# Patient Record
Sex: FEMALE | Race: WHITE | Employment: OTHER | URBAN - METROPOLITAN AREA
[De-identification: names, ages, dates, MRNs, and addresses within clinical notes are randomized per-mention and may not be internally consistent; named-entity substitution may affect disease eponyms.]

---

## 2017-02-03 ENCOUNTER — HOSPITAL ENCOUNTER (OUTPATIENT)
Dept: MRI IMAGING | Age: 77
Discharge: HOME | End: 2017-02-03
Attending: ORTHOPAEDIC SURGERY
Payer: MEDICARE

## 2017-02-03 DIAGNOSIS — M75.101 ROTATOR CUFF SYNDROME OF RIGHT SHOULDER: ICD-10-CM

## 2017-02-03 PROCEDURE — 73221 MRI JOINT UPR EXTREM W/O DYE: CPT

## 2017-03-29 ENCOUNTER — OFFICE VISIT (OUTPATIENT)
Dept: CARDIOLOGY CLINIC | Age: 77
End: 2017-03-29

## 2017-03-29 VITALS
HEIGHT: 61 IN | SYSTOLIC BLOOD PRESSURE: 130 MMHG | OXYGEN SATURATION: 99 % | WEIGHT: 139.4 LBS | DIASTOLIC BLOOD PRESSURE: 60 MMHG | BODY MASS INDEX: 26.32 KG/M2 | HEART RATE: 70 BPM

## 2017-03-29 DIAGNOSIS — R01.1 SYSTOLIC EJECTION MURMUR: ICD-10-CM

## 2017-03-29 DIAGNOSIS — I10 ESSENTIAL HYPERTENSION: Primary | ICD-10-CM

## 2017-03-29 PROBLEM — E78.5 HYPERLIPIDEMIA: Status: ACTIVE | Noted: 2017-03-29

## 2017-03-29 RX ORDER — MELATONIN
DAILY
COMMUNITY
End: 2018-02-15

## 2017-03-29 RX ORDER — FENOFIBRATE 160 MG/1
160 TABLET ORAL DAILY
COMMUNITY
End: 2019-07-06

## 2017-03-29 RX ORDER — AMLODIPINE BESYLATE 10 MG/1
TABLET ORAL DAILY
COMMUNITY
End: 2019-07-06

## 2017-03-29 RX ORDER — LOSARTAN POTASSIUM 100 MG/1
100 TABLET ORAL DAILY
COMMUNITY
End: 2019-07-06

## 2017-03-29 RX ORDER — ASPIRIN 81 MG/1
TABLET ORAL DAILY
COMMUNITY
End: 2019-07-06

## 2017-03-29 RX ORDER — OMEPRAZOLE 20 MG/1
20 CAPSULE, DELAYED RELEASE ORAL DAILY
COMMUNITY
End: 2019-07-06

## 2017-03-29 NOTE — PROGRESS NOTES
Visit Vitals    /60 (BP 1 Location: Right arm, BP Patient Position: Sitting)    Pulse 70    Ht 5' 1\" (1.549 m)    Wt 139 lb 6.4 oz (63.2 kg)    SpO2 99%    BMI 26.34 kg/m2

## 2017-03-29 NOTE — PROGRESS NOTES
Joe Covarrubias MD [facility] [place]  [address]  [place], [address] [facility]    Office [phone]  Fax [phone]  Cell [phone]        Sameer Childers is a 64 y.o. female referred for evaluation of \"refractory HTN\". Consult requested by AICHA Duarte. Assessment  Encounter Diagnoses   Name Primary?  Essential hypertension Yes    Systolic ejection murmur        Recommendations:    Sameer Childers has chronic HTN controlled over the years on combination therapy. Recent elevation in systolic BP over the past month, likely related to orthopedic issues. She follows a low sodium diet and no symptoms of sleep apnea. Her BP is normal today. Her exam is significant for murmur of aortic sclerosis. At this junction, I do not see the need to intensify therapy, but could start HCTZ if her systolic BP starts to creep up again. Favor baseline echocardiogram to evaluate for LVH. If her echocardiogram and follow up BPs are unremarkable, I will see her back as needed. Subjective:    Ms. Dennis Vigil has chronic HTN on two medication regimen (Amlodipine and Losartan) but no other cardiac hx. Historically, her blood pressure is under good control. Two years ago, her BPs ran in the 715F systolic and 64-49M diastolic range. Over the past month, her systolic BPs have been in the 130-150s range, which is unusual for her. Evaluated by [facility] who started her on Norvasc 10mg daily. She injured her rotator cuff while moving boxes and was put on Flexeril. This has caused her significant stress and discomfort which may have contributed to elevated systolic blood pressure. She leads a very active lifestyle. She follows a low sodium diet. She sleeps well and denies any symptoms of sleep apnea. Patient denies any exertional chest pain, dyspnea, palpitations, syncope, orthopnea, edema or paroxysmal nocturnal dyspnea.     Cardiac risk factors   HTN yes  DM no  Smoking no  Family hx of CAD no    Cardiac testing  No specialty comments available. No past medical history on file. Current Outpatient Prescriptions   Medication Sig Dispense Refill    amLODIPine (NORVASC) 10 mg tablet Take  by mouth daily.  fenofibrate (LOFIBRA) 160 mg tablet Take 160 mg by mouth daily.  losartan (COZAAR) 100 mg tablet Take 100 mg by mouth daily.  omeprazole (PRILOSEC) 20 mg capsule Take 20 mg by mouth daily.  cholecalciferol (VITAMIN D3) 1,000 unit tablet Take  by mouth daily.  aspirin delayed-release 81 mg tablet Take  by mouth daily. Allergies   Allergen Reactions    Ampicillin Contact Dermatitis    Lisinopril Unknown (comments)     Mind confusion                Review of Systems  Constitutional: Negative for fever, chills, malaise/fatigue and diaphoresis. Respiratory: Negative for cough, hemoptysis, sputum production, shortness of breath and wheezing. Cardiovascular: Negative for chest pain, palpitations, orthopnea, claudication, leg swelling and PND. Gastrointestinal: Negative for heartburn, nausea, vomiting, blood in stool and melena. Genitourinary: Negative for dysuria and flank pain. Musculoskeletal: Negative for joint pain and back pain. Skin: Negative for rash. Neurological: Negative for focal weakness, seizures, loss of consciousness, weakness and headaches. Endo/Heme/Allergies: Does not bruise/bleed easily. Psychiatric/Behavioral: Negative for memory loss. The patient does not have insomnia. Physical Exam    Visit Vitals    /60 (BP 1 Location: Right arm, BP Patient Position: Sitting)    Pulse 70    Ht 5' 1\" (1.549 m)    Wt 139 lb 6.4 oz (63.2 kg)    SpO2 99%    BMI 26.34 kg/m2     Wt Readings from Last 3 Encounters:   03/29/17 139 lb 6.4 oz (63.2 kg)      General - well developed well nourished  Neck - JVP normal, thyroid nl  Cardiac - normal S1, S2, rubs or gallops. No clicks.  +Grade 1-2 early peaking systolic ejection murmur at the right base.     Vascular - carotids without bruits, radials, femorals and pedal pulses equal bilateral  Lungs - clear to auscultation bilaterals, no rales, wheezing or rhonchi  Abd - soft nontender, no HSM, no abd bruits  Extremities - no edema  Skin - no rash  Neuro - nonfocal  Psych - normal mood and affect    Cardiographics    EKG 03/29/17-SR, normal     Written by Erika Shipley, as dictated by Sumeet Durbin MD.   Sumeet Durbin MD

## 2017-03-29 NOTE — MR AVS SNAPSHOT
Visit Information Date & Time Provider Department Dept. Phone Encounter #  
 3/29/2017 11:00 AM Sumeet Durbin MD [facility] [place] [phone] [id number] Upcoming Health Maintenance Date Due DTaP/Tdap/Td series (1 - Tdap) 11/2/1959 ZOSTER VACCINE AGE 60> 11/2/1998 GLAUCOMA SCREENING Q2Y 11/2/2003 OSTEOPOROSIS SCREENING (DEXA) 11/2/2003 Pneumococcal 65+ Low/Medium Risk (1 of 2 - PCV13) 11/2/2003 MEDICARE YEARLY EXAM 11/2/2003 INFLUENZA AGE 9 TO ADULT 8/1/2016 Allergies as of 3/29/2017  Review Complete On: 3/29/2017 By: Shannen Zheng  
  
 Severity Noted Reaction Type Reactions Ampicillin  03/29/2017    Contact Dermatitis Lisinopril  03/29/2017    Unknown (comments) Mind confusion Current Immunizations  Never Reviewed No immunizations on file. Not reviewed this visit You Were Diagnosed With   
  
 Codes Comments Essential hypertension    -  Primary ICD-10-CM: I10 
ICD-9-CM: 401.9 Familial hypercholesterolemia     ICD-10-CM: E78.01 
ICD-9-CM: 272.0 Vitals BP Pulse Height(growth percentile) Weight(growth percentile) SpO2 BMI  
 130/60 (BP 1 Location: Right arm, BP Patient Position: Sitting) 70 5' 1\" (1.549 m) 139 lb 6.4 oz (63.2 kg) 99% 26.34 kg/m2 Smoking Status Never Smoker Vitals History BMI and BSA Data Body Mass Index Body Surface Area  
 26.34 kg/m 2 1.65 m 2 Preferred Pharmacy Pharmacy Name Phone [facility] [address] [phone] Your Updated Medication List  
  
   
This list is accurate as of: 3/29/17 11:26 AM.  Always use your most recent med list. amLODIPine 10 mg tablet Commonly known as:  Benjiman Floro Take  by mouth daily. aspirin delayed-release 81 mg tablet Take  by mouth daily. fenofibrate 160 mg tablet Commonly known as:  LOFIBRA Take 160 mg by mouth daily. losartan 100 mg tablet Commonly known as:  COZAAR Take 100 mg by mouth daily. omeprazole 20 mg capsule Commonly known as:  PRILOSEC Take 20 mg by mouth daily. VITAMIN D3 1,000 unit tablet Generic drug:  cholecalciferol Take  by mouth daily. We Performed the Following AMB POC EKG ROUTINE W/ 12 LEADS, INTER & REP [65167 CPT(R)] Introducing [facility] SERVICES! [facility] introduces Glints patient portal. Now you can access parts of your medical record, email your doctor's office, and request medication refills online. 1. In your internet browser, go to [email or web address] 2. Click on the First Time User? Click Here link in the Sign In box. You will see the New Member Sign Up page. 3. Enter your Glints Access Code exactly as it appears below. You will not need to use this code after youve completed the sign-up process. If you do not sign up before the expiration date, you must request a new code. · Glints Access Code: [email or web address] Expires: 4/30/2017  9:43 AM 
 
4. Enter the last four digits of your Social Security Number (xxxx) and Date of Birth (mm/dd/yyyy) as indicated and click Submit. You will be taken to the next sign-up page. 5. Create a Glints ID. This will be your Glints login ID and cannot be changed, so think of one that is secure and easy to remember. 6. Create a Glints password. You can change your password at any time. 7. Enter your Password Reset Question and Answer. This can be used at a later time if you forget your password. 8. Enter your e-mail address. You will receive e-mail notification when new information is available in [address] 9. Click Sign Up. You can now view and download portions of your medical record. 10. Click the Download Summary menu link to download a portable copy of your medical information. If you have questions, please visit the Frequently Asked Questions section of the [employer] website. Remember, [employer] is NOT to be used for urgent needs. For medical emergencies, dial 911. Now available from your iPhone and Android! Please provide this summary of care documentation to your next provider. Your primary care clinician is listed as Audrey Gutierrez. If you have any questions after today's visit, please call [phone].

## 2017-04-07 ENCOUNTER — CLINICAL SUPPORT (OUTPATIENT)
Dept: CARDIOLOGY CLINIC | Age: 77
End: 2017-04-07

## 2017-04-07 DIAGNOSIS — I10 ESSENTIAL HYPERTENSION: ICD-10-CM

## 2017-04-11 ENCOUNTER — TELEPHONE (OUTPATIENT)
Dept: CARDIOLOGY CLINIC | Age: 77
End: 2017-04-11

## 2017-04-11 NOTE — TELEPHONE ENCOUNTER
Cardiac testing  Echo 4/7/17 - EF 55-60%. No WMA. Grade 1 diastolic dysfunction. Mild LAE. Ms. Deysi Churchill was seen by Dr. Lily Castano as a new patient on 3/29/17, referred for evaluation of \"refractory HTN\" by AICHA Stinson. Left a voicemail for Ms. Gracia to return my call to discuss Echo results. Note preserved EF with no LVH. No additional cardiac testing is needed at this time. Plan to also follow up on BP trend since her initial visit. If BP is stable, we would be happy to see her again PRN.

## 2017-04-12 ENCOUNTER — TELEPHONE (OUTPATIENT)
Dept: CARDIOLOGY CLINIC | Age: 77
End: 2017-04-12

## 2017-04-12 NOTE — TELEPHONE ENCOUNTER
Patient notified of results. She voices understanding. She states her BP this morning was 135/56. P76. This is pretty consistent.

## 2017-08-31 ENCOUNTER — OFFICE VISIT (OUTPATIENT)
Dept: OBGYN CLINIC | Age: 77
End: 2017-08-31

## 2017-08-31 VITALS
BODY MASS INDEX: 26.21 KG/M2 | WEIGHT: 138.8 LBS | HEIGHT: 61 IN | SYSTOLIC BLOOD PRESSURE: 148 MMHG | DIASTOLIC BLOOD PRESSURE: 70 MMHG

## 2017-08-31 DIAGNOSIS — R10.2 VULVAR PAIN: ICD-10-CM

## 2017-08-31 DIAGNOSIS — Z87.440 HISTORY OF CYSTITIS: Primary | ICD-10-CM

## 2017-08-31 LAB
BILIRUB UR QL STRIP: NEGATIVE
GLUCOSE UR-MCNC: NEGATIVE MG/DL
KETONES P FAST UR STRIP-MCNC: NEGATIVE MG/DL
PH UR STRIP: NORMAL [PH] (ref 4.6–8)
PROT UR QL STRIP: NEGATIVE MG/DL
SP GR UR STRIP: NORMAL (ref 1–1.03)
UA UROBILINOGEN AMB POC: NORMAL (ref 0.2–1)
URINALYSIS CLARITY POC: CLEAR
URINALYSIS COLOR POC: YELLOW
URINE BLOOD POC: NEGATIVE
URINE LEUKOCYTES POC: NEGATIVE
URINE NITRITES POC: NEGATIVE

## 2017-08-31 NOTE — PROGRESS NOTES
Vulvar lesion evaluation    Val Guillermo is a 64 y.o. female  No LMP recorded. Patient is postmenopausal.who presents with 3 bumps on her right labia. She noticed it a few weeks ago. The bumps have not shown any changes. [facility] for chiggers and burning with urination, urine was positive for nitrates. She was dx with contact dermatitis, cystitis, and yeast infection. Patient completed abx therapy. She went to a follow up appointment on 8/7/17 and was referred to GYN. She reports the following associated symptoms: Symptoms have improved, she denies burning with urination but she still feels a lot of pressure in her vagina. She denies the following associated symptoms: itching, pain, redness, drainage, swelling, irritation. Aggravating factors: none  Alleviating factors: none    Urine dip today is negative for leukocytes, nitrates, and blood. Patient states it has been a few years since her last GYN exam/pap smear, mammogram, and bone density. She said \"I don't need one\"      No past medical history on file. Past Surgical History:   Procedure Laterality Date    HX APPENDECTOMY      HX TUBAL LIGATION       Social History     Occupational History    Not on file. Social History Main Topics    Smoking status: Never Smoker    Smokeless tobacco: Never Used    Alcohol use No    Drug use: No    Sexual activity: Not Currently     Family History   Problem Relation Age of Onset    Cancer Sister      cervical    COPD Brother     Hypertension Mother     Hypertension Father     Emphysema Father        Allergies   Allergen Reactions    Ampicillin Contact Dermatitis    Lisinopril Unknown (comments)     Mind confusion             Prior to Admission medications    Medication Sig Start Date End Date Taking? Authorizing Provider   amLODIPine (NORVASC) 10 mg tablet Take  by mouth daily.    Yes Historical Provider   fenofibrate (LOFIBRA) 160 mg tablet Take 160 mg by mouth daily. Yes Historical Provider   losartan (COZAAR) 100 mg tablet Take 100 mg by mouth daily. Yes Historical Provider   omeprazole (PRILOSEC) 20 mg capsule Take 20 mg by mouth daily. Yes Historical Provider   cholecalciferol (VITAMIN D3) 1,000 unit tablet Take  by mouth daily. Yes Historical Provider   aspirin delayed-release 81 mg tablet Take  by mouth daily.    Yes Historical Provider        Review of Systems: History obtained from the patient  Constitutional: negative for weight loss, fever, night sweats  GI: negative for change in bowel habits, abdominal pain, black or bloody stools  : negative for frequency, dysuria, hematuria, vaginal discharge  MSK: negative for back pain, joint pain, muscle pain  Skin: negative for itching, rash, hives elsewhere  Neuro: negative for dizziness, headache, confusion, weakness  Psych: negative for anxiety, depression, change in mood  Heme/lymph: negative for bleeding, bruising, pallor    Objective:  Visit Vitals    /70    Ht 5' 1\" (1.549 m)    Wt 138 lb 12.8 oz (63 kg)    BMI 26.23 kg/m2       Physical Exam:   PHYSICAL EXAMINATION    Constitutional  · Appearance: well-nourished, well developed, alert, in no acute distress    Gastrointestinal  · Abdominal Examination: abdomen non-tender to palpation, normal bowel sounds, no masses present  · Liver and spleen: no hepatomegaly present, spleen not palpable  · Hernias: no hernias identified    Genitourinary  · External Genitalia: normal appearance for age, no discharge present, no tenderness present, no inflammatory lesions present, no masses present, no atrophy present  · Vagina: normal vaginal vault with mild cystocele,  no discharge present, no inflammatory lesions present, no masses present  · Bladder: non-tender to palpation  · Urethra: appears normal  · Cervix: normal   · Uterus: normal size, small fibroids  · Adnexa: no adnexal tenderness present, no adnexal masses present  · Perineum: perineum within normal limits, no evidence of trauma, no rashes or skin lesions present  · Anus: anus within normal limits, no hemorrhoids present  · Inguinal Lymph Nodes: no lymphadenopathy present    Skin  · General Inspection: no rash, no lesions identified    Neurologic/Psychiatric  · Mental Status:  · Orientation: grossly oriented to person, place and time  · Mood and Affect: mood normal, affect appropriate    Assessment:   Asymptomatic cystocele  Small fibroids  UTI resolved  Vulva normal    Plan:   Does not desire any tx for cystocele - no hx of frequent UTI      RTO prn if symptoms persist or worsen. Instructions given to pt. Handouts given to pt.

## 2017-08-31 NOTE — PATIENT INSTRUCTIONS
Pelvic Exam: Care Instructions  Your Care Instructions    When your doctor examines all of your pelvic organs, it's called a pelvic exam. Two good reasons to have this kind of exam are to check for sexually transmitted infections (STIs) and to get a Pap test. A Pap test is also called a Pap smear. It checks for early changes that can lead to cancer of the cervix. Sometimes a pelvic exam is part of a regular checkup. In this case, you can do some things to make your test results as accurate as possible. · Try to schedule the exam when you don't have your period. · Don't use douches, tampons, or vaginal medicines, sprays, or powders for 24 hours before your exam.  · Don't have sex for 24 hours before your exam.  Other times, women have this kind of exam at any time of the month. This is because they have pelvic pain, bleeding, or discharge. Or they may have another pelvic problem. Before your exam, it's important to share some information with your doctor. For example, if you are a survivor of rape or sexual abuse, you can talk about any concerns you may have. Your doctor will also want to know if you are pregnant or use birth control. And he or she will want to hear about any problems, surgeries, or procedures you have had in your pelvic area. You will also need to tell your doctor when your last period was. Follow-up care is a key part of your treatment and safety. Be sure to make and go to all appointments, and call your doctor if you are having problems. It's also a good idea to know your test results and keep a list of the medicines you take. How is a pelvic exam done? · During a pelvic exam, you will:  ¨ Take off your clothes below the waist. You will get a paper or cloth cover to put over the lower half of your body. Sebastian Bishop on your back on an exam table. Your feet will be raised above you. Stirrups will support your feet. · The doctor will:  Johnny Kim you to relax your knees.  Your knees need to lean out, toward the walls. ¨ Check the opening of your vagina for sores or swelling. ¨ Gently put a tool called a speculum into your vagina. It opens the vagina a little bit. You will feel some pressure. But if you are relaxed, it will not hurt. It lets your doctor see inside the vagina. ¨ Use a small brush, spatula, or swab to get a sample of cells, if you are having a Pap test or culture. The doctor then removes the speculum. ¨ Put on gloves and put one or two fingers of one hand into your vagina. The other hand goes on your lower belly. This lets your doctor feel your pelvic organs. You will probably feel some pressure. Try to stay relaxed. ¨ Put one gloved finger into your rectum and one into your vagina, if needed. This can also help check your pelvic organs. This exam takes about 10 minutes. At the end, you will get a washcloth or tissue to clean your vaginal area. It's normal to have some discharge after this exam. You can then get dressed. Some test results may be ready right away. But results from a culture or a Pap test may take several days or a few weeks. Why should you have a pelvic exam?  · You want to have recommended screening tests. This includes a Pap test.  · You think you have a vaginal infection. Signs include itching, burning, or unusual discharge. · You might have been exposed to a sexually transmitted infection (STI), such as chlamydia or herpes. · You have vaginal bleeding that is not part of your normal menstrual period. · You have pain in your belly or pelvis. · You have been sexually assaulted. A pelvic exam lets your doctor collect evidence and check for STIs. · You are pregnant. · You are having trouble getting pregnant. What are the risks of a pelvic exam?  There are no risks from a pelvic exam.  When should you call for help?   Watch closely for changes in your health, and be sure to contact your doctor if:  · You have heavy bleeding or discharge from your vagina after the exam.  Where can you learn more? Go to http://jay-kasie.info/. Enter J670 in the search box to learn more about \"Pelvic Exam: Care Instructions. \"  Current as of: October 13, 2016  Content Version: 11.3  © 2532-8115 "[employer]", [employer] Care instructions adapted under license by [employer] (which disclaims liability or warranty for this information). If you have questions about a medical condition or this instruction, always ask your healthcare professional. [address] any warranty or liability for your use of this information.

## 2018-01-18 ENCOUNTER — HOSPITAL ENCOUNTER (OUTPATIENT)
Dept: GENERAL RADIOLOGY | Age: 78
Discharge: HOME | End: 2018-01-18
Payer: MEDICARE

## 2018-01-18 DIAGNOSIS — K21.9 GERD (GASTROESOPHAGEAL REFLUX DISEASE): ICD-10-CM

## 2018-01-18 DIAGNOSIS — R10.13 EPIGASTRIC PAIN: ICD-10-CM

## 2018-01-18 PROCEDURE — 71046 X-RAY EXAM CHEST 2 VIEWS: CPT

## 2018-02-15 ENCOUNTER — HOSPITAL ENCOUNTER (EMERGENCY)
Age: 78
Discharge: HOME | End: 2018-02-15
Attending: EMERGENCY MEDICINE
Payer: MEDICARE

## 2018-02-15 VITALS
WEIGHT: 136.91 LBS | SYSTOLIC BLOOD PRESSURE: 146 MMHG | RESPIRATION RATE: 16 BRPM | OXYGEN SATURATION: 98 % | DIASTOLIC BLOOD PRESSURE: 80 MMHG | BODY MASS INDEX: 25.85 KG/M2 | TEMPERATURE: 98.6 F | HEIGHT: 61 IN | HEART RATE: 75 BPM
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
## 2018-02-15 DIAGNOSIS — S41.152A DOG BITE OF ARM, LEFT, INITIAL ENCOUNTER: Primary | ICD-10-CM

## 2018-02-15 PROCEDURE — 74011250636 HC RX REV CODE- 250/636: Performed by: EMERGENCY MEDICINE

## 2018-02-15 PROCEDURE — 74011000250 HC RX REV CODE- 250: Performed by: EMERGENCY MEDICINE

## 2018-02-15 PROCEDURE — 99283 EMERGENCY DEPT VISIT LOW MDM: CPT

## 2018-02-15 PROCEDURE — 90471 IMMUNIZATION ADMIN: CPT

## 2018-02-15 PROCEDURE — 90715 TDAP VACCINE 7 YRS/> IM: CPT | Performed by: EMERGENCY MEDICINE

## 2018-02-15 RX ORDER — BACITRACIN ZINC 500 [USP'U]/G
1 CREAM TOPICAL ONCE
Status: COMPLETED | OUTPATIENT
Start: 2018-02-15 | End: 2018-02-15

## 2018-02-15 RX ORDER — BACITRACIN 500 [USP'U]/G
1 OINTMENT TOPICAL 2 TIMES DAILY
Qty: 1 TUBE | Refills: 0 | Status: SHIPPED | OUTPATIENT
Start: 2018-02-15 | End: 2018-02-25

## 2018-02-15 RX ADMIN — BACITRACIN ZINC 1 PACKET: 500 OINTMENT TOPICAL at 18:39

## 2018-02-15 RX ADMIN — TETANUS TOXOID, REDUCED DIPHTHERIA TOXOID AND ACELLULAR PERTUSSIS VACCINE, ADSORBED 0.5 ML: 5; 2.5; 8; 8; 2.5 SUSPENSION INTRAMUSCULAR at 18:43

## 2018-02-15 NOTE — ED TRIAGE NOTES
Pt.  was bit by an unknown dog at [employer] today to left forearm while walking in the park. Gauze bandage in place. [facility] Animal [facility] was notified and showed up to the scene when it happened. Tetanus shot ~5years ago.

## 2018-02-15 NOTE — DISCHARGE INSTRUCTIONS
Animal Bites: Care Instructions  Your Care Instructions  After an animal bite, the biggest concern is infection. The chance of infection depends on the type of animal that bit you, where on your body you were bitten, and your general health. Many animal bites are not closed with stitches, because this can increase the chance of infection. Your bite may take as little as 7 days or as long as several months to heal, depending on how bad it is. Taking good care of your wound at home will help it heal and reduce your chance of infection. The doctor has checked you carefully, but problems can develop later. If you notice any problems or new symptoms, get medical treatment right away. Follow-up care is a key part of your treatment and safety. Be sure to make and go to all appointments, and call your doctor if you are having problems. It's also a good idea to know your test results and keep a list of the medicines you take. How can you care for yourself at home? · If your doctor told you how to care for your wound, follow your doctor's instructions. If you did not get instructions, follow this general advice:  ¨ After 24 to 48 hours, gently wash the wound with clean water 2 times a day. Do not scrub or soak the wound. Don't use hydrogen peroxide or alcohol, which can slow healing. ¨ You may cover the wound with a thin layer of petroleum jelly, such as Vaseline, and a nonstick bandage. ¨ Apply more petroleum jelly and replace the bandage as needed. · After you shower, gently dry the wound with a clean towel. · If your doctor has closed the wound, cover the bandage with a plastic bag before you take a shower. · A small amount of skin redness and swelling around the wound edges and the stitches or staples is normal. Your wound may itch or feel irritated. Do not scratch or rub the wound.   · Ask your doctor if you can take an over-the-counter pain medicine, such as acetaminophen (Tylenol), ibuprofen (Advil, Motrin), or naproxen (Aleve). Read and follow all instructions on the label. · Do not take two or more pain medicines at the same time unless the doctor told you to. Many pain medicines have acetaminophen, which is Tylenol. Too much acetaminophen (Tylenol) can be harmful. · If your bite puts you at risk for rabies, you will get a series of shots over the next few weeks to prevent rabies. Your doctor will tell you when to get the shots. It is very important that you get the full cycle of shots. Follow your doctor's instructions exactly. · You may need a tetanus shot if you have not received one in the last 5 years. · If your doctor prescribed antibiotics, take them as directed. Do not stop taking them just because you feel better. You need to take the full course of antibiotics. When should you call for help? Call your doctor now or seek immediate medical care if:  ? · The skin near the bite turns cold or pale or it changes color. ? · You lose feeling in the area near the bite, or it feels numb or tingly. ? · You have trouble moving a limb near the bite. ? · You have symptoms of infection, such as:  ¨ Increased pain, swelling, warmth, or redness near the wound. ¨ Red streaks leading from the wound. ¨ Pus draining from the wound. ¨ A fever. ? · Blood soaks through the bandage. Oozing small amounts of blood is normal.   ? · Your pain is getting worse. ? Watch closely for changes in your health, and be sure to contact your doctor if you are not getting better as expected. Where can you learn more? Go to http://jay-kasie.info/. Enter X348 in the search box to learn more about \"Animal Bites: Care Instructions. \"  Current as of: March 20, 2017  Content Version: 11.4  © 8932-6358 [employer]. Care instructions adapted under license by [employer] (which disclaims liability or warranty for this information).  If you have questions about a medical condition or this instruction, always ask your healthcare professional. [place] [address] any warranty or liability for your use of this information.

## 2018-02-15 NOTE — ED PROVIDER NOTES
HPI Comments: 59-year-old white female with no significant past medical history presents to the emergency department with a dog bite. Patient reports that she was at the park walking. She reports that there was a couple who have their dogs on leashes. One dog came up and bit the patient on her left forearm. She did not get the information of the person or the dog. Patient is complaining of some mild pain in the left forearm where she was bit. Patient reports normal function of her left hand. Patient is unsure of her last tetanus shot. Patient has not received rabies immunizations previously. Patient denies fevers, cough, headache, chest pain, shortness of breath, abdominal pain. Patient denies tobacco or alcohol use. The history is provided by the patient. Past Medical History:   Diagnosis Date    High triglycerides     Hypertension        Past Surgical History:   Procedure Laterality Date    HX APPENDECTOMY      HX KNEE REPLACEMENT      [id number]    HX TUBAL LIGATION           Family History:   Problem Relation Age of Onset    Cancer Sister      cervical    COPD Brother     Hypertension Mother     Hypertension Father     Emphysema Father        Social History     Social History    Marital status:      Spouse name: N/A    Number of children: N/A    Years of education: N/A     Occupational History    Not on file. Social History Main Topics    Smoking status: Never Smoker    Smokeless tobacco: Never Used    Alcohol use No    Drug use: No    Sexual activity: Not Currently     Other Topics Concern    Not on file     Social History Narrative         ALLERGIES: Ampicillin and Lisinopril    Review of Systems   Constitutional: Negative for fever. HENT: Negative for facial swelling and nosebleeds. Eyes: Negative for pain. Respiratory: Negative for cough, chest tightness and shortness of breath. Cardiovascular: Negative for chest pain and leg swelling.    Gastrointestinal: Negative for abdominal pain and vomiting. Endocrine: Negative for polyuria. Genitourinary: Negative for difficulty urinating and flank pain. Musculoskeletal: Negative for arthralgias and back pain. Skin: Positive for wound. Negative for color change. Allergic/Immunologic: Negative for immunocompromised state. Neurological: Negative for dizziness and headaches. Hematological: Does not bruise/bleed easily. Psychiatric/Behavioral: Negative for agitation. All other systems reviewed and are negative. Vitals:    02/15/18 1726   BP: 146/63   Pulse: 72   Resp: 14   Temp: 98.6 °F (37 °C)   SpO2: 99%   Weight: 62.1 kg (136 lb 14.5 oz)   Height: 5' 1\" (1.549 m)            Physical Exam   Constitutional: She is oriented to person, place, and time. She appears well-developed and well-nourished. HENT:   Head: Normocephalic and atraumatic. Right Ear: External ear normal.   Left Ear: External ear normal.   Nose: Nose normal.   Mouth/Throat: Oropharynx is clear and moist.   Eyes: EOM are normal. Pupils are equal, round, and reactive to light. No scleral icterus. Neck: Normal range of motion. Neck supple. No JVD present. No tracheal deviation present. No thyromegaly present. Cardiovascular: Normal rate, regular rhythm, normal heart sounds and intact distal pulses. Exam reveals no friction rub. No murmur heard. Pulmonary/Chest: Effort normal and breath sounds normal. No stridor. No respiratory distress. She has no wheezes. She has no rales. She exhibits no tenderness. Abdominal: Soft. Bowel sounds are normal. She exhibits no distension. There is no tenderness. There is no rebound and no guarding. Musculoskeletal: Normal range of motion. She exhibits no edema, tenderness or deformity. 2 small puncture wounds on left forearm w/ no active bleeding. 5/5 hand  on left and normal sensation of left hand   Lymphadenopathy:     She has no cervical adenopathy.    Neurological: She is alert and oriented to person, place, and time. She has normal reflexes. No cranial nerve deficit. Coordination normal.   Skin: Skin is warm and dry. No rash noted. No erythema. Psychiatric: She has a normal mood and affect. Her behavior is normal. Judgment and thought content normal.   Nursing note and vitals reviewed. MDM  Number of Diagnoses or Management Options  Diagnosis management comments: Patient has 2 puncture wounds on the left forearm. Will treat with tetanus immunization as well as Will treat with rabies vaccine and immunoglobulin. We'll have her return for a rabies vaccine for the remainder of the series. We'll treat her with bacitracin ointment. No real indication for oral and about at this time. We'll give her good return precautions and will irrigate the wounds well. ED Course       Procedures   6:37 PM  Pt has spoken to her son. They would like to decline her Rabies IG and Vaccine today. Pt understands risks and benefits of treatment. Will give her TD. If she worsens, she can always come back for Rabies IG and Vaccine treatment. Good return precautions given to patient. Close follow up with PCP recommended. Patient and/or family voices understanding of this plan. Discharge instructions were explained by me and all concerns were addressed.

## 2018-02-16 ENCOUNTER — HOSPITAL ENCOUNTER (EMERGENCY)
Age: 78
Discharge: HOME | End: 2018-02-16
Attending: EMERGENCY MEDICINE
Payer: MEDICARE

## 2018-02-16 VITALS
HEIGHT: 61 IN | DIASTOLIC BLOOD PRESSURE: 67 MMHG | RESPIRATION RATE: 16 BRPM | TEMPERATURE: 98.1 F | OXYGEN SATURATION: 99 % | SYSTOLIC BLOOD PRESSURE: 158 MMHG | BODY MASS INDEX: 25.68 KG/M2 | HEART RATE: 78 BPM | WEIGHT: 136 LBS

## 2018-02-16 DIAGNOSIS — Z23 NEED FOR RABIES VACCINATION: Primary | ICD-10-CM

## 2018-02-16 PROCEDURE — 90675 RABIES VACCINE IM: CPT | Performed by: EMERGENCY MEDICINE

## 2018-02-16 PROCEDURE — 74011250636 HC RX REV CODE- 250/636: Performed by: EMERGENCY MEDICINE

## 2018-02-16 PROCEDURE — 99282 EMERGENCY DEPT VISIT SF MDM: CPT

## 2018-02-16 PROCEDURE — 90375 RABIES IG IM/SC: CPT | Performed by: EMERGENCY MEDICINE

## 2018-02-16 PROCEDURE — 90471 IMMUNIZATION ADMIN: CPT

## 2018-02-16 RX ADMIN — RABIES VACCINE 2.5 UNITS: KIT at 14:16

## 2018-02-16 RX ADMIN — RABIES IMMUNE GLOBULIN (HUMAN) 1230 UNITS: 150 INJECTION INTRAMUSCULAR at 14:17

## 2018-02-16 NOTE — ED PROVIDER NOTES
HPI Comments: 76 y.o. female with no significant past medical history who presents from home via private vehicle with chief complaint of dog bite to left forearm. Pt reports that she was bitten by a dog in the park while out exercising yesterday. She was seen at [facility] urgent care clinic after the incident and was referred here to the ED where she was given a Tetanus vaccination and offered the rabies vaccination that she was denied at that time. She returns to the ED today to start the rabies vaccination series. She states that she plans to complete the rabies series with her PCP who has agreed to order the other doses of the vaccination for her, but she was advised to come here today to start the series. She reports that she does not know the owner of the dog and has no way to quarantine the [place]. She denies fever, chills, CP, SOB, nausea, vomiting, abdominal pain. There are no other acute medical concerns at this time. PCP: Deidra Greene MD  Note written by paulette Velez, as dictated by Liudmila Grossman MD 1:19 PM        The history is provided by the patient. Past Medical History:   Diagnosis Date    High triglycerides     Hypertension        Past Surgical History:   Procedure Laterality Date    HX APPENDECTOMY      HX KNEE REPLACEMENT      [id number]    HX TUBAL LIGATION           Family History:   Problem Relation Age of Onset    Cancer Sister      cervical    COPD Brother     Hypertension Mother     Hypertension Father     Emphysema Father        Social History     Social History    Marital status:      Spouse name: N/A    Number of children: N/A    Years of education: N/A     Occupational History    Not on file.      Social History Main Topics    Smoking status: Never Smoker    Smokeless tobacco: Never Used    Alcohol use No    Drug use: No    Sexual activity: Not Currently     Other Topics Concern    Not on file     Social History Narrative ALLERGIES: Ampicillin and Lisinopril    Review of Systems   Constitutional: Negative for chills and fever. Respiratory: Negative for shortness of breath. Cardiovascular: Negative for chest pain. Gastrointestinal: Negative for abdominal pain, nausea and vomiting. Skin: Positive for wound. Negative for color change. All other systems reviewed and are negative. Vitals:    02/16/18 1217   BP: 158/67   Pulse: 82   Resp: 16   Temp: 98.1 °F (36.7 °C)   SpO2: 98%   Weight: 61.7 kg (136 lb)   Height: 5' 1\" (1.549 m)            Physical Exam   Constitutional: She is oriented to person, place, and time. She appears well-developed and well-nourished. No distress. HENT:   Head: Normocephalic and atraumatic. Eyes: Conjunctivae are normal.   Neck: Normal range of motion. Cardiovascular: Normal rate, regular rhythm, normal heart sounds and intact distal pulses. Exam reveals no friction rub. No murmur heard. Pulmonary/Chest: Effort normal and breath sounds normal. No respiratory distress. She has no wheezes. She has no rales. She exhibits no tenderness. Abdominal: Soft. Bowel sounds are normal. She exhibits no distension. There is no tenderness. There is no rebound and no guarding. Musculoskeletal: Normal range of motion. Neurological: She is alert and oriented to person, place, and time. Coordination normal.   Skin: Skin is warm and dry. She is not diaphoretic. No pallor. 2 abrasions to left forearm no drainage or erythema; FROM no swelling   Psychiatric: She has a normal mood and affect. Her behavior is normal.   Nursing note and vitals reviewed. MDM  Number of Diagnoses or Management Options  Need for rabies vaccination:   Diagnosis management comments: Give vaccines no need for abx at this time    Discussed need for other vaccines .  She is going to get from pcp    Patient Progress  Patient progress: stable        ED Course       Other Procedure  Date/Time: 2/16/2018 3:05 PM  Performed by: Dagmar Woods  Authorized by: Dagmar Woods     Consent:     Consent obtained:  Verbal    Consent given by:  Patient    Risks discussed:  Pain    Alternatives discussed:  No treatment  Anesthesia (see MAR for exact dosages): Anesthesia method:  None  Post-procedure details:     Patient tolerance of procedure:   Tolerated well, no immediate complications  Comments:      Cleansed bite sites with alcohol swab and injected 1 ml of immunoglobulin around site x 2 wounds for a total of 2 ml

## 2018-02-16 NOTE — DISCHARGE INSTRUCTIONS
Rabies Vaccine: What You Need to Know  What is rabies? Rabies is a serious disease. It is caused by a virus. Rabies is mainly a disease of animals. Humans get rabies when they are bitten by infected animals. At first there might not be any symptoms. But weeks, or even months after a bite, rabies can cause pain, fatigue, headaches, fever, and irritability. These are followed by seizures, hallucinations, and paralysis. Human rabies is almost always fatal.  Wild animals-especially bats-are the most common source of human rabies infection in the [place]. Skunks, raccoons, dogs, cats, coyotes, foxes and other mammals can also transmit the disease. Human rabies is rare in the [place]. There have been only 54 cases diagnosed since 1990. However, between 16,000 and 39,000 people are vaccinated each year as a precaution after animal bites. Also, rabies is far more common in other parts of the world, with about 40,000 -70,000 rabies-related deaths worldwide each year. Bites from unvaccinated dogs cause most of these cases. Rabies vaccine can prevent rabies. Rabies vaccine  Rabies vaccine is given to people at high risk of rabies to protect them if they are exposed. It can also prevent the disease if it is given to a person after they have been exposed. Rabies vaccine is made from killed rabies virus. It cannot cause rabies. Who should get rabies vaccine and when? Preventive vaccination (no exposure)  · People at high risk of exposure to rabies, such as veterinarians, animal handlers, rabies laboratory workers, spelunkers, and rabies biologics production workers should be offered rabies vaccine. · The vaccine should also be considered for:  ¨ People whose activities bring them into frequent contact with rabies virus or with possibly rabid animals. ¨ International travelers who are likely to come in contact with animals in parts of the world where rabies is common.   · The pre-exposure schedule for rabies vaccination is 3 doses, given at the following times:  ¨ Dose 1: As appropriate  ¨ Dose 2: 7 days after Dose 1  ¨ Dose 3: 21 days or 28 days after Dose 1  For laboratory workers and others who may be repeatedly exposed to rabies virus, periodic testing for immunity is recommended, and booster doses should be given as needed. (Testing or booster doses are not recommended for travelers.) Ask your doctor for details. Vaccination After an Exposure  Anyone who has been bitten by an animal, or who otherwise may have been exposed to rabies, should clean the wound and see a doctor immediately. The doctor will determine if they need to be vaccinated. A person who is exposed and has never been vaccinated against rabies should get 4 doses of rabies vaccine-one dose right away, and additional doses on the 3rd, 7th, and 14th days. They should also get another shot called Rabies Immune Globulin at the same time as the first dose. A person who has been previously vaccinated should get 2 doses of rabies vaccine-one right away and another on the 3rd day. Rabies Immune Globulin is not needed. Tell your doctor if . . .  Talk with a doctor before getting rabies vaccine if you:  1. Ever had a serious (life-threatening) allergic reaction to a previous dose of rabies vaccine, or to any component of the vaccine; tell your doctor if you have any severe allergies. 2. Have a weakened immune system because of:  ¨ HIV/AIDS or another disease that affects the immune system. ¨ Treatment with drugs that affect the immune system, such as steroids. ¨ Cancer, or cancer treatment with radiation or drugs. If you have a minor illnesses, such as a cold, you can be vaccinated. If you are moderately or severely ill, you should probably wait until you recover before getting a routine (non-exposure) dose of rabies vaccine.   If you have been exposed to rabies virus, you should get the vaccine regardless of any other illnesses you may have.  What are the risks from rabies vaccine? A vaccine, like any medicine, is capable of causing serious problems, such as severe allergic reactions. The risk of a vaccine causing serious harm, or death, is extremely small. Serious problems from rabies vaccine are very rare. Mild problems  · Soreness, redness, swelling, or itching where the shot was given (30%-74%)  · Headache, nausea, abdominal pain, muscle aches, dizziness (5%-40%)  Moderate problems  · Hives, pain in the joints, fever (about 6% of booster doses)  Other nervous system disorders, such as Guillain Barré syndrome (GBS), have been reported after rabies vaccine, but this happens so rarely that it is not known whether they are related to the vaccine. NOTE: Several brands of rabies vaccine are available in the [place], and reactions may vary between brands. Your provider can give you more information about a particular brand. What if there is a serious reaction? What should I look for? · Look for anything that concerns you, such as signs of a severe allergic reaction, very high fever, or behavior changes. Signs of a severe allergic reaction can include hives, swelling of the face and throat, difficulty breathing, a fast heartbeat, dizziness, and weakness. These would start a few minutes to a few hours after the vaccination. What should I do? · If you think it is a severe allergic reaction or other emergency that can't wait, call 9-1-1 or get the person to the nearest hospital. Otherwise, call your doctor. · Afterward, the reaction should be reported to the Vaccine Adverse Event Reporting System (VAERS). Your doctor might file this report, or you can do it yourself through the VAERS web site at www.vaers. hhs.gov, or by calling 2-591.592.3484. VAERS is only for reporting reactions. They do not give medical advice. How can I learn more? · Ask your doctor. · Call your local or state health department.   · Contact the Centers for Disease Control and Prevention (CDC):  ¨ Visit CDC's rabies website at www.cdc.gov/rabies/  Vaccine Information Statement  Rabies Vaccine  (10/6/2009)  Department of Health and Human Services  Centers for Disease Control and Prevention  Many Vaccine Information Statements are available in [place] and other languages. See www.immunize.org/vis. Hojas de [employer]form[employer] Sobre Vacunas están disponibles en Español y en muchos otros idiomas. Visite Kam[employer].si. Care instructions adapted under license by [employer] (which disclaims liability or warranty for this information). If you have questions about a medical condition or this instruction, always ask your healthcare professional. [address] any warranty or liability for your use of this information.

## 2018-02-19 ENCOUNTER — HOSPITAL ENCOUNTER (OUTPATIENT)
Dept: INFUSION THERAPY | Age: 78
Discharge: HOME | End: 2018-02-19
Payer: MEDICARE

## 2018-02-19 VITALS
RESPIRATION RATE: 18 BRPM | HEART RATE: 74 BPM | DIASTOLIC BLOOD PRESSURE: 71 MMHG | TEMPERATURE: 97.1 F | SYSTOLIC BLOOD PRESSURE: 146 MMHG

## 2018-02-19 PROCEDURE — 96372 THER/PROPH/DIAG INJ SC/IM: CPT

## 2018-02-19 PROCEDURE — 74011250636 HC RX REV CODE- 250/636: Performed by: EMERGENCY MEDICINE

## 2018-02-19 PROCEDURE — 90675 RABIES VACCINE IM: CPT | Performed by: EMERGENCY MEDICINE

## 2018-02-19 RX ADMIN — RABIES VACCINE 2.5 UNITS: KIT at 08:37

## 2018-02-19 NOTE — PROGRESS NOTES
Problem: Knowledge Deficit  Goal: *Verbalizes understanding of procedures and medications  Outcome: Progressing Towards Goal  Rabies

## 2018-02-19 NOTE — PROGRESS NOTES
Outpatient Infusion Center Short Visit Progress Note    0830 Pt admit to [facility] for D 3 Rabies injection ambulatory in stable condition. Assessment completed. No new concerns voiced. Pt received dog bit to lower left  Forearm- small scabs present that she is putting bacitracin ointment to. Visit Vitals    /71    Pulse 74    Temp 97.1 °F (36.2 °C)    Resp 18    Breastfeeding No           Medications:  Rabies to Left upper arm    0850  Pt tolerated treatment well. D/c home ambulatory in no distress.  Pt aware of next appointment scheduled for 2/23/18 @ 3 pm.

## 2018-02-23 ENCOUNTER — HOSPITAL ENCOUNTER (OUTPATIENT)
Dept: INFUSION THERAPY | Age: 78
Discharge: HOME | End: 2018-02-23
Payer: MEDICARE

## 2018-02-23 VITALS
HEART RATE: 72 BPM | SYSTOLIC BLOOD PRESSURE: 133 MMHG | TEMPERATURE: 97.2 F | DIASTOLIC BLOOD PRESSURE: 64 MMHG | RESPIRATION RATE: 18 BRPM

## 2018-02-23 PROCEDURE — 90675 RABIES VACCINE IM: CPT | Performed by: EMERGENCY MEDICINE

## 2018-02-23 PROCEDURE — 96372 THER/PROPH/DIAG INJ SC/IM: CPT

## 2018-02-23 PROCEDURE — 74011250636 HC RX REV CODE- 250/636: Performed by: EMERGENCY MEDICINE

## 2018-02-23 RX ADMIN — RABIES VACCINE 2.5 UNITS: KIT at 15:00

## 2018-02-23 NOTE — PROGRESS NOTES
Outpatient Infusion Center Short Visit Progress Note    5514 Pt admit to [facility] for Rabies Vaccine ambulatory in stable condition. Assessment completed. No new concerns voiced. Patient Vitals for the past 12 hrs:   Temp Pulse Resp BP   02/23/18 1457 97.2 °F (36.2 °C) 72 18 133/64       Medications:  Rabies Vaccine IM R arm    1505 Pt tolerated treatment well. D/c home ambulatory in no distress. Pt aware of next appointment scheduled for 3/2/18 at 1500.     [facility], RN

## 2018-03-02 ENCOUNTER — HOSPITAL ENCOUNTER (OUTPATIENT)
Dept: INFUSION THERAPY | Age: 78
Discharge: HOME | End: 2018-03-02
Payer: MEDICARE

## 2018-03-02 VITALS
TEMPERATURE: 98 F | RESPIRATION RATE: 18 BRPM | SYSTOLIC BLOOD PRESSURE: 136 MMHG | DIASTOLIC BLOOD PRESSURE: 66 MMHG | HEART RATE: 68 BPM

## 2018-03-02 PROCEDURE — 96372 THER/PROPH/DIAG INJ SC/IM: CPT

## 2018-03-02 PROCEDURE — 90675 RABIES VACCINE IM: CPT | Performed by: EMERGENCY MEDICINE

## 2018-03-02 PROCEDURE — 74011250636 HC RX REV CODE- 250/636: Performed by: EMERGENCY MEDICINE

## 2018-03-02 RX ADMIN — Medication 2.5 UNITS: at 15:09

## 2018-03-02 NOTE — PROGRESS NOTES
[facility] VISIT NOTE    4035  Pt arrived at [facility] ambulatory and in no distress for Rabies Vaccine. No acute complaints at this time. Medications received:  Rabies Vaccine IM    Blood pressure 136/66, pulse 68, temperature 98 °F (36.7 °C), resp. rate 18. Tolerated treatment well, no adverse reaction noted. 1510  D/C'd from [facility] ambulatory and in no distress. Next appointment is to be scheduled by MD/pt.

## 2019-07-06 ENCOUNTER — HOSPITAL ENCOUNTER (OUTPATIENT)
Age: 79
Setting detail: OBSERVATION
Discharge: HOME | End: 2019-07-07
Attending: EMERGENCY MEDICINE | Admitting: INTERNAL MEDICINE
Payer: MEDICARE

## 2019-07-06 ENCOUNTER — APPOINTMENT (OUTPATIENT)
Dept: CT IMAGING | Age: 79
End: 2019-07-06
Attending: EMERGENCY MEDICINE
Payer: MEDICARE

## 2019-07-06 DIAGNOSIS — I63.9 CEREBROVASCULAR ACCIDENT (CVA), UNSPECIFIED MECHANISM (HCC): Primary | ICD-10-CM

## 2019-07-06 DIAGNOSIS — R42 DIZZINESS: ICD-10-CM

## 2019-07-06 PROBLEM — G45.9 TIA (TRANSIENT ISCHEMIC ATTACK): Status: ACTIVE | Noted: 2019-07-06

## 2019-07-06 PROBLEM — R27.0 ATAXIA: Status: ACTIVE | Noted: 2019-07-06

## 2019-07-06 LAB
ANION GAP BLD CALC-SCNC: 15 MMOL/L (ref 10–20)
APPEARANCE UR: CLEAR
BACTERIA URNS QL MICRO: NEGATIVE /HPF
BASOPHILS # BLD: 0.1 K/UL (ref 0–0.1)
BASOPHILS NFR BLD: 1 % (ref 0–1)
BILIRUB UR QL: NEGATIVE
BUN BLD-MCNC: 19 MG/DL (ref 9–20)
CA-I BLD-MCNC: 1.16 MMOL/L (ref 1.12–1.32)
CHLORIDE BLD-SCNC: 98 MMOL/L (ref 98–107)
CO2 BLD-SCNC: 29 MMOL/L (ref 21–32)
COLOR UR: ABNORMAL
COMMENT, HOLDF: NORMAL
CREAT BLD-MCNC: 0.8 MG/DL (ref 0.6–1.3)
DIFFERENTIAL METHOD BLD: ABNORMAL
EOSINOPHIL # BLD: 0.1 K/UL (ref 0–0.4)
EOSINOPHIL NFR BLD: 1 % (ref 0–7)
EPITH CASTS URNS QL MICRO: ABNORMAL /LPF
ERYTHROCYTE [DISTWIDTH] IN BLOOD BY AUTOMATED COUNT: 13.8 % (ref 11.5–14.5)
GLUCOSE BLD-MCNC: 136 MG/DL (ref 65–100)
GLUCOSE UR STRIP.AUTO-MCNC: NEGATIVE MG/DL
HCT VFR BLD AUTO: 40.5 % (ref 35–47)
HCT VFR BLD CALC: 38 % (ref 35–47)
HGB BLD-MCNC: 13 G/DL (ref 11.5–16)
HGB UR QL STRIP: ABNORMAL
HYALINE CASTS URNS QL MICRO: ABNORMAL /LPF (ref 0–5)
IMM GRANULOCYTES # BLD AUTO: 0 K/UL (ref 0–0.04)
IMM GRANULOCYTES NFR BLD AUTO: 0 % (ref 0–0.5)
KETONES UR QL STRIP.AUTO: NEGATIVE MG/DL
LEUKOCYTE ESTERASE UR QL STRIP.AUTO: ABNORMAL
LYMPHOCYTES # BLD: 1 K/UL (ref 0.8–3.5)
LYMPHOCYTES NFR BLD: 9 % (ref 12–49)
MAGNESIUM SERPL-MCNC: 2.2 MG/DL (ref 1.6–2.4)
MCH RBC QN AUTO: 27.8 PG (ref 26–34)
MCHC RBC AUTO-ENTMCNC: 32.1 G/DL (ref 30–36.5)
MCV RBC AUTO: 86.7 FL (ref 80–99)
MONOCYTES # BLD: 1.2 K/UL (ref 0–1)
MONOCYTES NFR BLD: 11 % (ref 5–13)
NEUTS SEG # BLD: 8.2 K/UL (ref 1.8–8)
NEUTS SEG NFR BLD: 78 % (ref 32–75)
NITRITE UR QL STRIP.AUTO: NEGATIVE
NRBC # BLD: 0 K/UL (ref 0–0.01)
NRBC BLD-RTO: 0 PER 100 WBC
PH UR STRIP: 8 [PH] (ref 5–8)
PLATELET # BLD AUTO: 310 K/UL (ref 150–400)
PMV BLD AUTO: 8.8 FL (ref 8.9–12.9)
POTASSIUM BLD-SCNC: 3.9 MMOL/L (ref 3.5–5.1)
PROT UR STRIP-MCNC: NEGATIVE MG/DL
RBC # BLD AUTO: 4.67 M/UL (ref 3.8–5.2)
RBC #/AREA URNS HPF: ABNORMAL /HPF (ref 0–5)
SAMPLES BEING HELD,HOLD: NORMAL
SERVICE CMNT-IMP: ABNORMAL
SODIUM BLD-SCNC: 137 MMOL/L (ref 136–145)
SP GR UR REFRACTOMETRY: 1.02 (ref 1–1.03)
TROPONIN I SERPL-MCNC: <0.05 NG/ML
UR CULT HOLD, URHOLD: NORMAL
UROBILINOGEN UR QL STRIP.AUTO: 0.2 EU/DL (ref 0.2–1)
WBC # BLD AUTO: 10.5 K/UL (ref 3.6–11)
WBC URNS QL MICRO: ABNORMAL /HPF (ref 0–4)

## 2019-07-06 PROCEDURE — 74011250637 HC RX REV CODE- 250/637: Performed by: EMERGENCY MEDICINE

## 2019-07-06 PROCEDURE — 99285 EMERGENCY DEPT VISIT HI MDM: CPT

## 2019-07-06 PROCEDURE — 84443 ASSAY THYROID STIM HORMONE: CPT

## 2019-07-06 PROCEDURE — 70450 CT HEAD/BRAIN W/O DYE: CPT

## 2019-07-06 PROCEDURE — 83735 ASSAY OF MAGNESIUM: CPT

## 2019-07-06 PROCEDURE — 99218 HC RM OBSERVATION: CPT

## 2019-07-06 PROCEDURE — 84439 ASSAY OF FREE THYROXINE: CPT

## 2019-07-06 PROCEDURE — 84484 ASSAY OF TROPONIN QUANT: CPT

## 2019-07-06 PROCEDURE — 93005 ELECTROCARDIOGRAM TRACING: CPT

## 2019-07-06 PROCEDURE — 80047 BASIC METABLC PNL IONIZED CA: CPT

## 2019-07-06 PROCEDURE — 94762 N-INVAS EAR/PLS OXIMTRY CONT: CPT

## 2019-07-06 PROCEDURE — 81001 URINALYSIS AUTO W/SCOPE: CPT

## 2019-07-06 PROCEDURE — 74011636320 HC RX REV CODE- 636/320: Performed by: RADIOLOGY

## 2019-07-06 PROCEDURE — 70496 CT ANGIOGRAPHY HEAD: CPT

## 2019-07-06 PROCEDURE — 36415 COLL VENOUS BLD VENIPUNCTURE: CPT

## 2019-07-06 PROCEDURE — 85025 COMPLETE CBC W/AUTO DIFF WBC: CPT

## 2019-07-06 RX ORDER — ONDANSETRON 2 MG/ML
4 INJECTION INTRAMUSCULAR; INTRAVENOUS
Status: DISCONTINUED | OUTPATIENT
Start: 2019-07-06 | End: 2019-07-07 | Stop reason: HOSPADM

## 2019-07-06 RX ORDER — ENOXAPARIN SODIUM 100 MG/ML
40 INJECTION SUBCUTANEOUS EVERY 24 HOURS
Status: DISCONTINUED | OUTPATIENT
Start: 2019-07-06 | End: 2019-07-07 | Stop reason: HOSPADM

## 2019-07-06 RX ORDER — SODIUM CHLORIDE 0.9 % (FLUSH) 0.9 %
5-40 SYRINGE (ML) INJECTION AS NEEDED
Status: DISCONTINUED | OUTPATIENT
Start: 2019-07-06 | End: 2019-07-07 | Stop reason: HOSPADM

## 2019-07-06 RX ORDER — ACETAMINOPHEN 325 MG/1
650 TABLET ORAL
Status: DISCONTINUED | OUTPATIENT
Start: 2019-07-06 | End: 2019-07-07 | Stop reason: HOSPADM

## 2019-07-06 RX ORDER — ASPIRIN 325 MG
325 TABLET ORAL ONCE
Status: COMPLETED | OUTPATIENT
Start: 2019-07-06 | End: 2019-07-06

## 2019-07-06 RX ORDER — CHOLECALCIFEROL (VITAMIN D3) 125 MCG
100 CAPSULE ORAL DAILY
COMMUNITY
End: 2021-12-14

## 2019-07-06 RX ORDER — ASPIRIN 325 MG
325 TABLET ORAL DAILY
Status: DISCONTINUED | OUTPATIENT
Start: 2019-07-07 | End: 2019-07-07 | Stop reason: HOSPADM

## 2019-07-06 RX ORDER — GLUCOSAM/CHONDRO/HERB 149/HYAL 750-100 MG
1 TABLET ORAL DAILY
COMMUNITY
End: 2021-12-14

## 2019-07-06 RX ORDER — MELATONIN
1000 DAILY
COMMUNITY
End: 2021-12-14

## 2019-07-06 RX ORDER — LANOLIN ALCOHOL/MO/W.PET/CERES
400 CREAM (GRAM) TOPICAL DAILY
COMMUNITY
End: 2021-12-14

## 2019-07-06 RX ORDER — ACETAMINOPHEN 650 MG/1
650 SUPPOSITORY RECTAL
Status: DISCONTINUED | OUTPATIENT
Start: 2019-07-06 | End: 2019-07-07 | Stop reason: HOSPADM

## 2019-07-06 RX ORDER — SODIUM CHLORIDE 0.9 % (FLUSH) 0.9 %
5-40 SYRINGE (ML) INJECTION EVERY 8 HOURS
Status: DISCONTINUED | OUTPATIENT
Start: 2019-07-06 | End: 2019-07-07 | Stop reason: HOSPADM

## 2019-07-06 RX ORDER — MULTIVIT WITH MINERALS/HERBS
1 TABLET ORAL DAILY
COMMUNITY

## 2019-07-06 RX ORDER — BISACODYL 5 MG
5 TABLET, DELAYED RELEASE (ENTERIC COATED) ORAL DAILY PRN
Status: DISCONTINUED | OUTPATIENT
Start: 2019-07-06 | End: 2019-07-07 | Stop reason: HOSPADM

## 2019-07-06 RX ADMIN — ASPIRIN 325 MG: 325 TABLET, COATED ORAL at 22:28

## 2019-07-06 RX ADMIN — IOPAMIDOL 100 ML: 755 INJECTION, SOLUTION INTRAVENOUS at 20:46

## 2019-07-07 ENCOUNTER — APPOINTMENT (OUTPATIENT)
Dept: NON INVASIVE DIAGNOSTICS | Age: 79
End: 2019-07-07
Attending: INTERNAL MEDICINE
Payer: MEDICARE

## 2019-07-07 ENCOUNTER — APPOINTMENT (OUTPATIENT)
Dept: MRI IMAGING | Age: 79
End: 2019-07-07
Attending: INTERNAL MEDICINE
Payer: MEDICARE

## 2019-07-07 VITALS
WEIGHT: 136.02 LBS | TEMPERATURE: 98.7 F | DIASTOLIC BLOOD PRESSURE: 65 MMHG | HEART RATE: 60 BPM | RESPIRATION RATE: 16 BRPM | HEIGHT: 60 IN | OXYGEN SATURATION: 94 % | SYSTOLIC BLOOD PRESSURE: 136 MMHG | BODY MASS INDEX: 26.71 KG/M2

## 2019-07-07 LAB
CHOLEST SERPL-MCNC: 198 MG/DL
EST. AVERAGE GLUCOSE BLD GHB EST-MCNC: 131 MG/DL
HBA1C MFR BLD: 6.2 % (ref 4.2–6.3)
HDLC SERPL-MCNC: 54 MG/DL
HDLC SERPL: 3.7 {RATIO} (ref 0–5)
LDLC SERPL CALC-MCNC: 123 MG/DL (ref 0–100)
LIPID PROFILE,FLP: ABNORMAL
T4 FREE SERPL-MCNC: 1.1 NG/DL (ref 0.8–1.5)
TRIGL SERPL-MCNC: 105 MG/DL (ref ?–150)
TSH SERPL DL<=0.05 MIU/L-ACNC: 1.33 UIU/ML (ref 0.36–3.74)
VLDLC SERPL CALC-MCNC: 21 MG/DL

## 2019-07-07 PROCEDURE — 96372 THER/PROPH/DIAG INJ SC/IM: CPT

## 2019-07-07 PROCEDURE — 80061 LIPID PANEL: CPT

## 2019-07-07 PROCEDURE — 99218 HC RM OBSERVATION: CPT

## 2019-07-07 PROCEDURE — 74011250636 HC RX REV CODE- 250/636: Performed by: INTERNAL MEDICINE

## 2019-07-07 PROCEDURE — 74011250637 HC RX REV CODE- 250/637: Performed by: INTERNAL MEDICINE

## 2019-07-07 PROCEDURE — 97535 SELF CARE MNGMENT TRAINING: CPT

## 2019-07-07 PROCEDURE — 97165 OT EVAL LOW COMPLEX 30 MIN: CPT

## 2019-07-07 PROCEDURE — 36415 COLL VENOUS BLD VENIPUNCTURE: CPT

## 2019-07-07 PROCEDURE — 83036 HEMOGLOBIN GLYCOSYLATED A1C: CPT

## 2019-07-07 PROCEDURE — 70551 MRI BRAIN STEM W/O DYE: CPT

## 2019-07-07 PROCEDURE — 70544 MR ANGIOGRAPHY HEAD W/O DYE: CPT

## 2019-07-07 RX ADMIN — ENOXAPARIN SODIUM 40 MG: 40 INJECTION SUBCUTANEOUS at 00:02

## 2019-07-07 RX ADMIN — Medication 10 ML: at 00:04

## 2019-07-07 RX ADMIN — ASPIRIN 325 MG: 325 TABLET, COATED ORAL at 08:50

## 2019-07-07 NOTE — PROGRESS NOTES
Hospitalist Progress Note      NAME: Sandra Mace   :  1940  MRM:  [id number]    Date/Time: 2019  6:57 AM       Assessment / Plan:      [de-identified] y.o.  female with a hx of HTN, Navya Shah who presented to the Emergency Department complaining of dizziness while working in yard    Principal Problem:   possible TIA (transient ischemic attack) (2019) / Ataxia / dizziness              -- neuro checks              -- neurology consultation              -- MRI / MRA brain              -- carotid doppler not needed 2/2 CTA head and neck              -- echocardiogram              -- check lipid profile              -- ASA daily              -- PT / OT / Speech evaluation        Active Problems:    Essential hypertension (3/29/2017). - Continue home anti-hypertensives        Hyperlipidemia (3/29/2017). - Continue statin. Check FLP     DVT ppx    Full Code       Subjective:     Chief Complaint:  dizzy    Dc home soon    ROS:  (bold if positive, if negative)      Tolerating PT  Tolerating Diet          Objective:       Vitals:          Last 24hrs VS reviewed since prior progress note.  Most recent are:    Visit Vitals  /47 (BP Patient Position: At rest)   Pulse 61   Temp 99 °F (37.2 °C)   Resp 19   Ht 5' (1.524 m)   Wt 61.7 kg (136 lb 0.4 oz)   SpO2 96%   BMI 26.57 kg/m²     SpO2 Readings from Last 6 Encounters:   19 96%   18 99%   02/15/18 98%   17 99%        No intake or output data in the 24 hours ending 19 0657       Exam:     Physical Exam:    Gen:  Well-developed, well-nourished, in no acute distress  HEENT:  Pink conjunctivae, PERRL, hearing intact to voice, moist mucous membranes  Neck:  Supple, without masses, thyroid non-tender  Resp:  No accessory muscle use, clear breath sounds without wheezes, rales or rhonchi  Card:  No murmurs, normal S1, S2 without thrills, bruits or peripheral edema  Abd:  Soft, non-tender, non-distended, normoactive bowel sounds are present  Musc:  No cyanosis, clubbing or edema  Skin:  No rashes or ulcers, skin turgor is good  Neuro:  Cranial nerves 3-12 are grossly intact, follows commands appropriately  Psych:  Good insight, oriented to person, place and time, alert       Telemetry reviewed:   normal sinus rhythm    Medications Reviewed: (see below)    Lab Data Reviewed: (see below)    ______________________________________________________________________    Medications:     Current Facility-Administered Medications   Medication Dose Route Frequency    sodium chloride (NS) flush 5-40 mL  5-40 mL IntraVENous Q8H    sodium chloride (NS) flush 5-40 mL  5-40 mL IntraVENous PRN    ondansetron (ZOFRAN) injection 4 mg  4 mg IntraVENous Q6H PRN    aspirin tablet 325 mg  325 mg Oral DAILY    acetaminophen (TYLENOL) tablet 650 mg  650 mg Oral Q4H PRN    Or    acetaminophen (TYLENOL) solution 650 mg  650 mg Per NG tube Q4H PRN    Or    acetaminophen (TYLENOL) suppository 650 mg  650 mg Rectal Q4H PRN    bisacodyl (DULCOLAX) tablet 5 mg  5 mg Oral DAILY PRN    enoxaparin (LOVENOX) injection 40 mg  40 mg SubCUTAneous Q24H     Current Outpatient Medications   Medication Sig    co-enzyme Q-10 (CO Q-10) 100 mg capsule Take 100 mg by mouth daily.  omega 3-DHA-EPA-fish oil 1,000 mg (120 mg-180 mg) capsule Take 1 Cap by mouth daily.  magnesium oxide (MAG-OX) 400 mg tablet Take 400 mg by mouth daily.  b complex vitamins tablet Take 1 Tab by mouth daily.  cholecalciferol (VITAMIN D3) 1,000 unit tablet Take 1,000 Units by mouth daily.             Lab Review:     Recent Labs     07/06/19  2100   WBC 10.5   HGB 13.0   HCT 40.5        Recent Labs     07/06/19  2100   MG 2.2     Lab Results   Component Value Date/Time    Glucose (POC) 136 (H) 07/06/2019 09:02 PM         Total time spent with patient: 30 895 [address] discussed with: Patient    Code Status: Full    Prophylaxis:  SCD's    Disposition:  Home w/Family           ___________________________________________________    Attending Physician: Chet Meyers MD

## 2019-07-07 NOTE — PROGRESS NOTES
Occupational Therapy neurological EVALUATION with discharge  Patient: Danii Castano (58 y.o. female)  Date: 7/7/2019  Primary Diagnosis: TIA (transient ischemic attack) [G45.9]       Precautions:       ASSESSMENT:  Cleared by RN to see pt for therapy session. Based on the objective data described below, the patient presents near independent baseline following admission for TIA. At baseline pt lives alone, is I with ADLs, IADLs and mobility. Received supine in bed, agreeable to participate. Performed functional transfers and mobility in room and hallway independently, no LOB or unsteadiness. Seated UB and LB ADLs performed with mod I, no difficulty reaching to LEs. No deficits in vision reported or observed, pt also denies deficits in sensation apart from intermittent tingling in hands from arthritis at baseline. Fugl Morales UE assessment performed and pt scored 66/66 indicating no impairment, UE ROM and strength WLF and equal bilaterally. Returned to bed at end of session with call bell in reach and needs met. She had no further questions or concerns for acute OT at end of session. Further skilled acute occupational therapy is not indicated at this time and anticipate no OT needs at discharge. Discharge Recommendations: None  Further Equipment Recommendations for Discharge: none     SUBJECTIVE:   Patient stated I normally can do everything myself.     OBJECTIVE DATA SUMMARY:   HISTORY:   Past Medical History:   Diagnosis Date    High triglycerides     Hypertension      Past Surgical History:   Procedure Laterality Date    HX APPENDECTOMY      HX KNEE REPLACEMENT      [id number]    HX TUBAL LIGATION         Prior Level of Function/Environment/Context: At baseline pt lives alone, is I with ADLs, IADLs and mobility. She is very active working in her garden and volunteering. Has supportive family nearby.   Home Situation  Home Environment: Private residence  # Steps to Enter: 3  Rails to Enter: No  One/Two Story Residence: One story  Living Alone: Yes  Support Systems: Friends \ neighbors, Family member(s)  Patient Expects to be Discharged to[de-identified] Private residence  Current DME Used/Available at Home: Cane, straight, Grab bars  Tub or Shower Type: Shower    Hand dominance: Right    EXAMINATION OF PERFORMANCE DEFICITS:  Cognitive/Behavioral Status:  Neurologic State: Alert  Orientation Level: Oriented X4  Cognition: Follows commands  Perception: Appears intact  Perseveration: No perseveration noted  Safety/Judgement: Awareness of environment; Fall prevention      Hearing: Auditory  Auditory Impairment: Hard of hearing, bilateral    Vision/Perceptual:    Tracking: Able to track stimulus in all quadrants w/o difficulty       Diplopia: No    Acuity: Able to read clock/calendar on wall without difficulty; Able to read employee name badge without difficulty         Range of Motion:  AROM: Within functional limits  PROM: Within functional limits          Strength:  Strength: Within functional limits       Coordination:  Coordination: Within functional limits  Fine Motor Skills-Upper: Left Intact; Right Intact    Gross Motor Skills-Upper: Left Intact; Right Intact(reports torn R RTC, ROM and strength intact)    Tone & Sensation:  Tone: Normal  Sensation: Intact        Balance:  Sitting: Intact  Standing: Intact    Functional Mobility and Transfers for ADLs:  Bed Mobility:  Supine to Sit: Independent  Sit to Supine: Independent    Transfers:  Sit to Stand: Modified independent           ADL Assessment:  Feeding: Independent    Oral Facial Hygiene/Grooming: Independent    Bathing: Modified independent    Upper Body Dressing: Independent    Lower Body Dressing: Modified independent    Toileting: Independent     ADL Intervention and task modifications:  Feeding  Feeding Assistance: Independent    Grooming  Washing Face: Independent           Lower Body Dressing Assistance  Socks: Modified independent  Leg Crossed Method Used: Yes  Position Performed: Seated edge of bed         Cognitive Retraining  Safety/Judgement: Awareness of environment; Fall prevention    Functional Measure:   Fugl-Morales Assessment of Motor Recovery after Stroke:     Reflex Activity  Flexors/Biceps/Fingers: Can be elicited  Extensors/Triceps: Can be elicited  Reflex Subtotal: 4    Volitional Movement Within Synergies  Shoulder Retraction: Full  Shoulder Elevation: Full  Shoulder Abduction (90 degrees): Full  Shoulder External Rotation: Full  Elbow Flexion: Full  Forearm Supination: Full  Shoulder Adduction/Internal Rotation: Full  Elbow Extension: Full  Forearm Pronation: Full  Subtotal: 18    Volitional Movement Mixing Synergies  Hand to Lumbar Spine: Full  Shoulder Flexion (0-90 degrees): Full  Pronation-Supination: Full  Subtotal: 6    Volitional Movement With Little or No Synergy  Shoulder Abduction (0-90 degrees): Full  Shoulder Flexion ( degrees): Full  Pronation/Supination: Full  Subtotal : 6    Normal Reflex Activity  Biceps, Triceps, Finger Flexors: Full  Subtotal : 2    Upper Extremity Total   Upper Extremity Total: 36    Wrist  Stability at 15 Degree Dorsiflexion: Full  Repeated Dorsiflexion/ Volar Flexion: Full  Stability at 15 Degree Dorsiflexion: Full  Repeated Dorsiflexion/ Volar Flexion: Full  Circumduction: Full  Wrist Total: 10    Hand  Mass Flexion: Full  Mass Extension: Full  Grasp A: Full  Grasp B: Full  Grasp C: Full  Grasp D: Full  Grasp E: Full  Hand Total: 14    Coordination/Speed  Tremor: None  Dysmetria: None  Time: <1s  Coordination/Speed Total : 6    Total A-D  Total A-D (Motor Function): 66/66      This is a reliable/valid measure of arm function after a neurological event. It has established value to characterize functional status and for measuring spontaneous and therapy-induced recovery; tests proximal and distal motor functions.  Fugl-Morales Assessment - UE scores recorded between five and 30 days post neurologic event can be used to predict UE recovery at six months post neurologic event. Severe = 0-21 points   Moderately Severe = 22-33 points   Moderate = 34-47 points   Mild = 48-66 points  FAY Mena, CHINO Cook, & YOSELYN Santos (1992). Measurement of motor recovery after stroke: Outcome assessment and sample size requirements. Stroke, 23, pp. 0548-3574.   ------------------------------------------------------------------------------------------------------------------------------------------------------------------  MCID:  Stroke:   Daisy Lovett et al, 2001; n = 171; mean age 79 (5) years; assessed within 16 (12) days of stroke, Acute Stroke)  FMA Motor Scores from Admission to Discharge   10 point increase in FMA Upper Extremity = 1.5 change in discharge FIM   10 point increase in FMA Lower Extremity = 1.9 change in discharge FIM  MDC:   Stroke:   Gustabo Hastings et al, 2008, n = 14, mean age = 59.9 (14.6) years, assessed on average 14 (6.5) months post stroke, Chronic Stroke)   FMA = 5.2 points for the Upper Extremity portion of the assessment     Barthel Index:    Bathin  Bladder: 10  Bowels: 10  Groomin  Dressing: 10  Feeding: 10  Mobility: 10  Stairs: 5  Toilet Use: 10  Transfer (Bed to Chair and Back): 15  Total: 90/100        Percentage of impairment   0%   1-19%   20-39%   40-59%   60-79%   80-99%   100%   Barthel Score 0-100 100 99-80 79-60 59-40 20-39 1-19   0     The Barthel ADL Index: Guidelines  1. The index should be used as a record of what a patient does, not as a record of what a patient could do. 2. The main aim is to establish degree of independence from any help, physical or verbal, however minor and for whatever reason. 3. The need for supervision renders the patient not independent. 4. A patient's performance should be established using the best available evidence.  Asking the patient, friends/relatives and nurses are the usual sources, but direct observation and common sense are also important. However direct testing is not needed. 5. Usually the patient's performance over the preceding 24-48 hours is important, but occasionally longer periods will be relevant. 6. Middle categories imply that the patient supplies over 50 per cent of the effort. 7. Use of aids to be independent is allowed. Mai Gonzales., Barthel, D.W. (7710). Functional evaluation: the Barthel Index. [address] [facility] (14)2. KARINA Mariee, Christopher Guallpa., Iggy Agarwal., [place], [address] [facility] (1999). Measuring the change indisability after inpatient rehabilitation; comparison of the responsiveness of the Barthel Index and Functional [place] Measure. Journal of Neurology, Neurosurgery, and Psychiatry, 66(4), 637-295. Trisha Chan NINGA.SENDY, HUE Rosado, & Justice Kuo MLorraineA. (2004.) Assessment of post-stroke quality of life in cost-effectiveness studies: The usefulness of the Barthel Index and the EuroQoL-5D. Quality of Life Research, 15, 242-17     Occupational Therapy Evaluation Charge Determination   History Examination Decision-Making   LOW Complexity : Brief history review  LOW Complexity : 1-3 performance deficits relating to physical, cognitive , or psychosocial skils that result in activity limitations and / or participation restrictions  LOW Complexity : No comorbidities that affect functional and no verbal or physical assistance needed to complete eval tasks       Based on the above components, the patient evaluation is determined to be of the following complexity level: LOW     Pain:  Pain Scale 1: Numeric (0 - 10)  Pain Intensity 1: 0              Activity Tolerance:   Good  Please refer to the flowsheet for vital signs taken during this treatment.   After treatment:   []  Patient left in no apparent distress sitting up in chair  [x]  Patient left in no apparent distress in bed  [x]  Call bell left within reach  [x]  Nursing notified  []  Caregiver present  []  Bed alarm activated    COMMUNICATION/EDUCATION:   Findings and recommendations were discussed with: Registered Nurse    Patient was educated regarding her deficit(s) of HA and fall as this relates to her diagnosis of TIA workup. She demonstrated Good understanding as evidenced by teachback. Patient and/or family was verbally educated on the BE FAST acronym for signs/symptoms of CVA and TIA. Informed patient to refer to the Stroke Binder for further BE FAST information. All questions answered with patient indicating good understanding. [x]      Home safety education was provided and the patient/caregiver indicated understanding. [x]      Patient/family have participated as able and agree with findings and recommendations. []      Patient is unable to participate in plan of care at this time.     Thank you for this referral.  Marcelina Montoya OT  Time Calculation: 21 mins

## 2019-07-07 NOTE — H&P
[facility]    Hospitalist Admission Note      NAME:  Matilde Marrero   :   1940   MRN:  [id number]     PCP:  Aurelio Bob MD     Date/Time:  2019 10:27 PM          Subjective:     CHIEF COMPLAINT: dizziness, loss of balance     HISTORY OF PRESENT ILLNESS:     Ms. Eleonora Becerra is a [de-identified] y.o.  female with a hx of HTN, Sherrajwinderle Los who presented to the Emergency Department complaining of dizziness while working in yard for 6-7 hrs, patient then lost her balance, leaning forward and fell on to right side without head trauma, was then unable to  or hold onto anything. In ED, CTA head and neck without thrombus, sx completely resolved, tele-neuro suprisingly recommends admission. We will admit for observation. Past Medical History:   Diagnosis Date    High triglycerides     Hypertension         Past Surgical History:   Procedure Laterality Date    HX APPENDECTOMY      HX KNEE REPLACEMENT      [id number]    HX TUBAL LIGATION         Social History     Tobacco Use    Smoking status: Never Smoker    Smokeless tobacco: Never Used   Substance Use Topics    Alcohol use: No        Family History   Problem Relation Age of Onset    Cancer Sister         cervical    COPD Brother     Hypertension Mother     Hypertension Father     Emphysema Father         Allergies   Allergen Reactions    Ampicillin Contact Dermatitis    Lisinopril Unknown (comments)     Mind confusion                Prior to Admission medications    Medication Sig Start Date End Date Taking? Authorizing Provider   amLODIPine (NORVASC) 10 mg tablet Take  by mouth daily. Provider, Historical   fenofibrate (LOFIBRA) 160 mg tablet Take 160 mg by mouth daily. Provider, Historical   losartan (COZAAR) 100 mg tablet Take 100 mg by mouth daily. Provider, Historical   omeprazole (PRILOSEC) 20 mg capsule Take 20 mg by mouth daily. Provider, Historical   aspirin delayed-release 81 mg tablet Take  by mouth daily. Provider, Historical       Review of Systems:  (bold if positive, if negative)    Gen:  Eyes:  ENT:  CVS:  Pulm:  GI:  :  MS:  Skin:  Psych:  Endo:  Hem:  Renal:  Neuro:  weakness      Objective:      VITALS:    Vital signs reviewed; most recent are:    Visit Vitals  /64   Pulse 71   Temp 99 °F (37.2 °C)   Resp 17   Ht 5' (1.524 m)   Wt 61.7 kg (136 lb 0.4 oz)   SpO2 93%   BMI 26.57 kg/m²     SpO2 Readings from Last 6 Encounters:   07/06/19 93%   02/16/18 99%   02/15/18 98%   03/29/17 99%        No intake or output data in the 24 hours ending 07/06/19 2227     Exam:     Physical Exam:    Gen:  Well-developed, well-nourished, in no acute distress  HEENT:  Pink conjunctivae, PERRL, hearing intact to voice, moist mucous membranes  Neck:  Supple, without masses, thyroid non-tender  Resp:  No accessory muscle use, clear breath sounds without wheezes rales or rhonchi  Card:  No murmurs, normal S1, S2 without thrills, bruits or peripheral edema  Abd:  Soft, non-tender, non-distended, normoactive bowel sounds are present, no palpable organomegaly and no detectable hernias  Lymph:  No cervical or inguinal adenopathy  Musc:  No cyanosis or clubbing  Skin:  No rashes or ulcers, skin turgor is good  Neuro:  Cranial nerves are grossly intact, no focal motor weakness, follows commands appropriately  Psych:  Good insight, oriented to person, place and time, alert     Labs:    Recent Labs     07/06/19  2100   WBC 10.5   HGB 13.0   HCT 40.5        Recent Labs     07/06/19  2100   MG 2.2     Lab Results   Component Value Date/Time    Glucose (POC) 136 (H) 07/06/2019 09:02 PM     No results for input(s): PH, PCO2, PO2, HCO3, FIO2 in the last 72 hours. No results for input(s): INR in the last 72 hours.     No lab exists for component: INREXT  All Micro Results     Procedure Component Value Units Date/Time    URINE CULTURE HOLD SAMPLE [[id number]] Collected:  07/06/19 2139    Order Status:  Completed Specimen:  Urine from Serum Updated:  07/06/19 2146     Urine culture hold       URINE ON HOLD IN MICROBIOLOGY DEPT FOR 3 DAYS. IF UNPRESERVED URINE IS SUBMITTED, IT CANNOT BE USED FOR ADDITIONAL TESTING AFTER 24 HRS, RECOLLECTION WILL BE REQUIRED. I have reviewed previous records       Assessment and Plan:      Principal Problem:    TIA (transient ischemic attack) (7/6/2019) / Ataxia / dizziness   -- neuro checks   -- neurology consultation   -- MRI / MRA brain   -- carotid doppler not needed 2/2 CTA head and neck   -- echocardiogram   -- check lipid profile   -- ASA daily   -- PT / OT / Speech evaluation      Active Problems:    Essential hypertension (3/29/2017). Continue home anti-hypertensives once conrfirmed      Hyperlipidemia (3/29/2017). Continue statin. Check FLP      Telemetry reviewed:   normal sinus rhythm    Risk of deterioration: medium      Total time spent with patient: 50 Minutes I reviewed chart, notes, data and current medications in the medical record. I have examined and treated the patient at bedside during this period.                  Care Plan discussed with: Patient, Family and Nursing Staff    Discussed:  Care Plan and D/C Planning       ___________________________________________________    Attending Physician: Fiorella Alex DO

## 2019-07-07 NOTE — PROGRESS NOTES
Bedside and Verbal shift change report given to Martha Bustos (oncoming nurse) by Jermaine Tsang (offgoing nurse).  Report included the following information SBAR, Kardex, Intake/Output, MAR, Recent Results and Cardiac Rhythm SR.

## 2019-07-07 NOTE — DISCHARGE SUMMARY
Physician Discharge Summary     Patient ID:  Hennie Dubin  [id number]  43 y.o.  1940    Admit date: 7/6/2019    Discharge date and time: 7/7/2019    Admission Diagnoses: TIA (transient ischemic attack) [G45.9]    Discharge Diagnoses:    Principal Problem:    TIA (transient ischemic attack) (7/6/2019)    Active Problems:    Essential hypertension (3/29/2017)      Hyperlipidemia (3/29/2017)      Ataxia (7/6/2019)      Dizziness (7/6/2019)           Hospital Course:   [de-identified] y.o.   female with a hx of HTNMasood presented to the Emergency Department complaining of dizziness while working in yard.      possible TIA (transient ischemic attack) (7/6/2019) / [place] / dizziness              -- neuro checks              -- neurology consultation  MRI unremarkable: No evidence of hemorrhage, infarct, or mass. Patent intracranial vasculature. Symptoms resolved and pt remained stable for discharge home to follow up with PCP in 1wk    PCP: Lebron Panda MD       Condition of patient at discharge: stable    Discharge Exam:  General:   WD, WN. Alert, cooperative, no acute distress    EENT:      EOMI. Anicteric sclerae. MMM  Resp:       CTA bilaterally, no wheezing or rales. No accessory muscle use  CV:           Regular  rhythm,  No edema  GI:            Soft, Non distended, Non tender.  +Bowel sounds  Neurologic: Alert and oriented X 3, normal speech,   Psych:       Good insight. Not anxious nor agitated  Skin:          No rashes. No jaundice    Disposition: home    Patient Instructions:   Current Discharge Medication List      CONTINUE these medications which have NOT CHANGED    Details   co-enzyme Q-10 (CO Q-10) 100 mg capsule Take 100 mg by mouth daily. omega 3-DHA-EPA-fish oil 1,000 mg (120 mg-180 mg) capsule Take 1 Cap by mouth daily. magnesium oxide (MAG-OX) 400 mg tablet Take 400 mg by mouth daily. b complex vitamins tablet Take 1 Tab by mouth daily.       cholecalciferol (VITAMIN D3) 1,000 unit tablet Take 1,000 Units by mouth daily.            Activity: Activity as tolerated  Diet: Regular Diet    Follow-up with Quang Pugh MD in 1-2weeks  Follow-up tests/labs n/a    Approximate time spent in patient care on day of discharge: Greater than 30mins     Signed:  Chetna Lara MD  7/7/2019  3:26 PM

## 2019-07-07 NOTE — PROGRESS NOTES
Bedside shift change report given to Nelson Munoz RN (oncoming nurse) by [facility] Cruz RN (offgoing nurse). Report included the following information SBAR, Kardex and MAR.

## 2019-07-07 NOTE — PROGRESS NOTES
[facility]: MED RECONCILIATION    Comments/Recommendations:   Med rec performed via interview with patient. Patient denies aspirin. Patient states she has not taken a prescription medication in over a year. Medications added: ALL     Allergies: Ampicillin and Lisinopril    Prior to Admission Medications:     Prior to Admission Medications   Prescriptions Last Dose Informant Patient Reported? Taking?   b complex vitamins tablet 7/6/2019 at Unknown time Self Yes Yes   Sig: Take 1 Tab by mouth daily. cholecalciferol (VITAMIN D3) 1,000 unit tablet 7/6/2019 at Unknown time Self Yes Yes   Sig: Take 1,000 Units by mouth daily. co-enzyme Q-10 (CO Q-10) 100 mg capsule 7/6/2019 at Unknown time Self Yes Yes   Sig: Take 100 mg by mouth daily. magnesium oxide (MAG-OX) 400 mg tablet 7/6/2019 at Unknown time Self Yes Yes   Sig: Take 400 mg by mouth daily. omega 3-DHA-EPA-fish oil 1,000 mg (120 mg-180 mg) capsule 7/6/2019 at Unknown time Self Yes Yes   Sig: Take 1 Cap by mouth daily.       Facility-Administered Medications: None         MARY Haskins   Contact: [phone]

## 2019-07-07 NOTE — ED PROVIDER NOTES
[de-identified] y.o. female with past medical history significant for HTN, hypertriglyceridemia, s/p appendectomy presents via EMS with chief complaint of dizziness and a headache, currently a 3/10 in severity, onset this afternoon after working in the heat for several hours. Pt explains that she was working in her garden today when she leant forward, lost her balance, and landed on her back and right elbow. No head trauma or loss of consciousness. She further indicates that she then \"turned over\" on her side to get back up, wherein she began experiencing some dizziness and a headache. Pt adds that she was on the ground for about 20 minutes. She continues to explain that she then went inside to drink some water, but that the water just \"ran down the front\" of her body. At the time, the pt reports that she had been experiencing some slurred speech, as well as about 2-3 episodes of bladder incontinence, which prompted the EMS call. Upon arrival, the pt reports that all but the dizziness and headache resolved. Of note, the pt includes that she had 2 cups of coffee and only 16 oz of water prior to working outside today. Also of note, the pt endorses that she normally spends 6-7 hours outside on a daily basis, but without any complications or difficulties. Pt denies any unilateral weakness, vision changes, numbness, or any other symptoms at this time. There are no other acute medical concerns at this time. Social hx: Patient denies Tobacco use. Denies EtOH use. Denies illicit drug abuse. PCP: Micha Maldonado MD    Note written by Rosi Haro, as dictated by Nadir Guzman MD 8:35 PM      The history is provided by the patient. No  was used.         Past Medical History:   Diagnosis Date    High triglycerides     Hypertension        Past Surgical History:   Procedure Laterality Date    HX APPENDECTOMY      HX KNEE REPLACEMENT      [id number]    HX TUBAL LIGATION           Family History: Problem Relation Age of Onset    Cancer Sister         cervical    COPD Brother     Hypertension Mother     Hypertension Father     Emphysema Father        Social History     Socioeconomic History    Marital status:      Spouse name: Not on file    Number of children: Not on file    Years of education: Not on file    Highest education level: Not on file   Occupational History    Not on file   Social Needs    Financial resource strain: Not on file    Food insecurity:     Worry: Not on file     Inability: Not on file    Transportation needs:     Medical: Not on file     Non-medical: Not on file   Tobacco Use    Smoking status: Never Smoker    Smokeless tobacco: Never Used   Substance and Sexual Activity    Alcohol use: No    Drug use: No    Sexual activity: Not Currently   Lifestyle    Physical activity:     Days per week: Not on file     Minutes per session: Not on file    Stress: Not on file   Relationships    Social connections:     Talks on phone: Not on file     Gets together: Not on file     Attends Confucianist service: Not on file     Active member of club or organization: Not on file     Attends meetings of clubs or organizations: Not on file     Relationship status: Not on file    Intimate partner violence:     Fear of current or ex partner: Not on file     Emotionally abused: Not on file     Physically abused: Not on file     Forced sexual activity: Not on file   Other Topics Concern    Not on file   Social History Narrative    Not on file         ALLERGIES: Ampicillin and Lisinopril    Review of Systems   Constitutional: Negative for chills and fever. Eyes: Negative for visual disturbance. Respiratory: Negative for shortness of breath. Cardiovascular: Negative for chest pain. Gastrointestinal: Negative for abdominal pain, nausea and vomiting. Genitourinary: Negative for dysuria and hematuria. Positive for bladder incontinence.    Neurological: Positive for dizziness, speech difficulty (slurred; resolved) and headaches. Negative for syncope (no LOC), facial asymmetry, weakness and numbness. All other systems reviewed and are negative. Vitals:    07/06/19 2023 07/06/19 2030 07/06/19 2056 07/06/19 2145   BP: (!) 202/63 181/65  161/64   Pulse: 81   71   Resp: 16   17   Temp: 99 °F (37.2 °C)      SpO2: 96% 98%  93%   Weight: 60.3 kg (133 lb)  61.7 kg (136 lb 0.4 oz)    Height: 5' (1.524 m)               Physical Exam   Constitutional: She is oriented to person, place, and time. She appears well-developed and well-nourished. HENT:   Head: Normocephalic and atraumatic. Eyes: Pupils are equal, round, and reactive to light. Conjunctivae and EOM are normal.   Neck: Normal range of motion. Cardiovascular: Normal rate, regular rhythm, normal heart sounds and intact distal pulses. No murmur heard. Pulmonary/Chest: Effort normal and breath sounds normal. No stridor. No respiratory distress. She has no wheezes. She has no rales. Abdominal: Soft. Bowel sounds are normal. She exhibits no distension and no mass. There is no tenderness. There is no guarding. Musculoskeletal: Normal range of motion. Neurological: She is alert and oriented to person, place, and time. No cranial nerve deficit. Coordination normal.   Finger to nose intact   Skin: Skin is warm and dry. Nursing note and vitals reviewed. MDM  Number of Diagnoses or Management Options  Cerebrovascular accident (CVA), unspecified mechanism ([address]):   Dizziness:   Diagnosis management comments: Could be heat exhaustion or dehydration though pt appears to have risk factors for stroke based on prior meds in computer and she is not taking her bp meds or fenofibrate    Will need stroke rule out.  Code s called        Amount and/or Complexity of Data Reviewed  Clinical lab tests: ordered and reviewed  Tests in the radiology section of CPT®: ordered and reviewed  Obtain history from someone other than the patient: yes (son)  Discuss the patient with other providers: yes (Hospitalist and neuro )  Independent visualization of images, tracings, or specimens: yes (ekg)    Patient Progress  Patient progress: stable         Procedures    ED EKG interpretation:  Rhythm: normal sinus rhythm; and regular . Rate (approx.): 70; Axis: normal; P wave: normal; QRS interval: normal ; ST/T wave: non-specific changes  EKG documented by Puja Torres MD, scribe, as interpreted by Beto Marie MD, ED MD.    2051- Spoke with Federico Armas who wants cta ordered provided bun creatinine are wnl    PROGRESS NOTE:  10:01 PM  Recommended admission; pt and son are currently discussing possible admission. Spoke with teleneuro based on read. bp has improved on own but he recommends admission     10:24 PM  Patient is being admitted to the hospital.  The results of their tests and reasons for their admission have been discussed with them and/or available family. They convey agreement and understanding for the need to be admitted and for their admission diagnosis. Son concerned pt may have some bipolar disorder and has been resistant to treatment over the years      CONSULT NOTE:  10:24 PM Beto Marie MD spoke with Dr. Casa Hu, Consult for Hospitalist.  Discussed available diagnostic tests and clinical findings. Dr. Casa Hu will evaluate the pt for hospital admission.

## 2019-07-07 NOTE — PROGRESS NOTES
Patient is adamant that she is leaving. Echo at bedside, but patient refuses. Still waiting for neurologist to see patient. Ave Workman MD at this time and he stated that he is going to discharge her.

## 2019-07-07 NOTE — PROGRESS NOTES
Patient discharged home. She stated that she understood discharge instructions. Patient wheeled out to car in w/c by me.

## 2019-07-07 NOTE — ED TRIAGE NOTES
Patient brought by EMS for a headache earlier today. States she was outside gardening and fell. Now c/o dizziness and nervousness and still has a headache. Onset between 1-2 pm.  Patient reported attempting to drink and being unable to. Denies any weakness.

## 2019-07-07 NOTE — ED NOTES
Bedside and Verbal shift change report given to [facility],[address] (oncoming nurse) by aGil Starks RN (offgoing nurse). Report included the following information SBAR, Kardex, MAR, Recent Results and Cardiac Rhythm NSR.

## 2019-07-08 LAB
ATRIAL RATE: 73 BPM
CALCULATED P AXIS, ECG09: -6 DEGREES
CALCULATED R AXIS, ECG10: 26 DEGREES
CALCULATED T AXIS, ECG11: 78 DEGREES
DIAGNOSIS, 93000: NORMAL
P-R INTERVAL, ECG05: 142 MS
Q-T INTERVAL, ECG07: 362 MS
QRS DURATION, ECG06: 78 MS
QTC CALCULATION (BEZET), ECG08: 398 MS
VENTRICULAR RATE, ECG03: 73 BPM

## 2019-07-21 ENCOUNTER — APPOINTMENT (OUTPATIENT)
Dept: CT IMAGING | Age: 79
End: 2019-07-21
Attending: EMERGENCY MEDICINE
Payer: MEDICARE

## 2019-07-21 ENCOUNTER — HOSPITAL ENCOUNTER (EMERGENCY)
Age: 79
Discharge: HOME | End: 2019-07-21
Attending: EMERGENCY MEDICINE
Payer: MEDICARE

## 2019-07-21 VITALS
DIASTOLIC BLOOD PRESSURE: 65 MMHG | RESPIRATION RATE: 14 BRPM | TEMPERATURE: 99.1 F | BODY MASS INDEX: 25.52 KG/M2 | HEART RATE: 82 BPM | HEIGHT: 60 IN | SYSTOLIC BLOOD PRESSURE: 150 MMHG | WEIGHT: 130 LBS | OXYGEN SATURATION: 96 %

## 2019-07-21 DIAGNOSIS — D72.829 LEUKOCYTOSIS, UNSPECIFIED TYPE: ICD-10-CM

## 2019-07-21 DIAGNOSIS — N30.01 ACUTE CYSTITIS WITH HEMATURIA: Primary | ICD-10-CM

## 2019-07-21 DIAGNOSIS — R53.83 FATIGUE, UNSPECIFIED TYPE: ICD-10-CM

## 2019-07-21 LAB
ALBUMIN SERPL-MCNC: 2.9 G/DL (ref 3.5–5)
ALBUMIN/GLOB SERPL: 0.7 {RATIO} (ref 1.1–2.2)
ALP SERPL-CCNC: 84 U/L (ref 45–117)
ALT SERPL-CCNC: 21 U/L (ref 12–78)
ANION GAP SERPL CALC-SCNC: 6 MMOL/L (ref 5–15)
APPEARANCE UR: ABNORMAL
AST SERPL-CCNC: 19 U/L (ref 15–37)
BACTERIA URNS QL MICRO: ABNORMAL /HPF
BASOPHILS # BLD: 0.1 K/UL (ref 0–0.1)
BASOPHILS NFR BLD: 0 % (ref 0–1)
BILIRUB SERPL-MCNC: 0.8 MG/DL (ref 0.2–1)
BILIRUB UR QL: NEGATIVE
BUN SERPL-MCNC: 20 MG/DL (ref 6–20)
BUN/CREAT SERPL: 18 (ref 12–20)
CALCIUM SERPL-MCNC: 8.8 MG/DL (ref 8.5–10.1)
CHLORIDE SERPL-SCNC: 103 MMOL/L (ref 97–108)
CO2 SERPL-SCNC: 27 MMOL/L (ref 21–32)
COLOR UR: ABNORMAL
COMMENT, HOLDF: NORMAL
CREAT SERPL-MCNC: 1.09 MG/DL (ref 0.55–1.02)
DIFFERENTIAL METHOD BLD: ABNORMAL
EOSINOPHIL # BLD: 0 K/UL (ref 0–0.4)
EOSINOPHIL NFR BLD: 0 % (ref 0–7)
EPITH CASTS URNS QL MICRO: ABNORMAL /LPF
ERYTHROCYTE [DISTWIDTH] IN BLOOD BY AUTOMATED COUNT: 13.5 % (ref 11.5–14.5)
GLOBULIN SER CALC-MCNC: 4.4 G/DL (ref 2–4)
GLUCOSE SERPL-MCNC: 150 MG/DL (ref 65–100)
GLUCOSE UR STRIP.AUTO-MCNC: NEGATIVE MG/DL
HCT VFR BLD AUTO: 40.5 % (ref 35–47)
HGB BLD-MCNC: 13.1 G/DL (ref 11.5–16)
HGB UR QL STRIP: ABNORMAL
IMM GRANULOCYTES # BLD AUTO: 0.2 K/UL (ref 0–0.04)
IMM GRANULOCYTES NFR BLD AUTO: 1 % (ref 0–0.5)
KETONES UR QL STRIP.AUTO: NEGATIVE MG/DL
LEUKOCYTE ESTERASE UR QL STRIP.AUTO: ABNORMAL
LIPASE SERPL-CCNC: 126 U/L (ref 73–393)
LYMPHOCYTES # BLD: 1.6 K/UL (ref 0.8–3.5)
LYMPHOCYTES NFR BLD: 7 % (ref 12–49)
MCH RBC QN AUTO: 27.6 PG (ref 26–34)
MCHC RBC AUTO-ENTMCNC: 32.3 G/DL (ref 30–36.5)
MCV RBC AUTO: 85.4 FL (ref 80–99)
MONOCYTES # BLD: 1.7 K/UL (ref 0–1)
MONOCYTES NFR BLD: 7 % (ref 5–13)
NEUTS SEG # BLD: 18.7 K/UL (ref 1.8–8)
NEUTS SEG NFR BLD: 85 % (ref 32–75)
NITRITE UR QL STRIP.AUTO: NEGATIVE
NRBC # BLD: 0 K/UL (ref 0–0.01)
NRBC BLD-RTO: 0 PER 100 WBC
PH UR STRIP: 5.5 [PH] (ref 5–8)
PLATELET # BLD AUTO: 384 K/UL (ref 150–400)
PMV BLD AUTO: 9.1 FL (ref 8.9–12.9)
POTASSIUM SERPL-SCNC: 3.6 MMOL/L (ref 3.5–5.1)
PROT SERPL-MCNC: 7.3 G/DL (ref 6.4–8.2)
PROT UR STRIP-MCNC: 100 MG/DL
RBC # BLD AUTO: 4.74 M/UL (ref 3.8–5.2)
RBC #/AREA URNS HPF: ABNORMAL /HPF (ref 0–5)
SAMPLES BEING HELD,HOLD: NORMAL
SODIUM SERPL-SCNC: 136 MMOL/L (ref 136–145)
SP GR UR REFRACTOMETRY: 1.01 (ref 1–1.03)
TROPONIN I SERPL-MCNC: <0.05 NG/ML
UR CULT HOLD, URHOLD: NORMAL
UROBILINOGEN UR QL STRIP.AUTO: 0.2 EU/DL (ref 0.2–1)
WBC # BLD AUTO: 22.3 K/UL (ref 3.6–11)
WBC URNS QL MICRO: >100 /HPF (ref 0–4)

## 2019-07-21 PROCEDURE — 83690 ASSAY OF LIPASE: CPT

## 2019-07-21 PROCEDURE — 96361 HYDRATE IV INFUSION ADD-ON: CPT

## 2019-07-21 PROCEDURE — 70450 CT HEAD/BRAIN W/O DYE: CPT

## 2019-07-21 PROCEDURE — 74011250636 HC RX REV CODE- 250/636: Performed by: EMERGENCY MEDICINE

## 2019-07-21 PROCEDURE — 81001 URINALYSIS AUTO W/SCOPE: CPT

## 2019-07-21 PROCEDURE — 84484 ASSAY OF TROPONIN QUANT: CPT

## 2019-07-21 PROCEDURE — 74011250637 HC RX REV CODE- 250/637: Performed by: EMERGENCY MEDICINE

## 2019-07-21 PROCEDURE — 85025 COMPLETE CBC W/AUTO DIFF WBC: CPT

## 2019-07-21 PROCEDURE — 87086 URINE CULTURE/COLONY COUNT: CPT

## 2019-07-21 PROCEDURE — 74011000250 HC RX REV CODE- 250: Performed by: EMERGENCY MEDICINE

## 2019-07-21 PROCEDURE — 96374 THER/PROPH/DIAG INJ IV PUSH: CPT

## 2019-07-21 PROCEDURE — 80053 COMPREHEN METABOLIC PANEL: CPT

## 2019-07-21 PROCEDURE — 87077 CULTURE AEROBIC IDENTIFY: CPT

## 2019-07-21 PROCEDURE — 87186 SC STD MICRODIL/AGAR DIL: CPT

## 2019-07-21 PROCEDURE — 93005 ELECTROCARDIOGRAM TRACING: CPT

## 2019-07-21 PROCEDURE — 36415 COLL VENOUS BLD VENIPUNCTURE: CPT

## 2019-07-21 PROCEDURE — 99284 EMERGENCY DEPT VISIT MOD MDM: CPT

## 2019-07-21 PROCEDURE — 99283 EMERGENCY DEPT VISIT LOW MDM: CPT

## 2019-07-21 RX ORDER — SODIUM CHLORIDE 0.9 % (FLUSH) 0.9 %
5-40 SYRINGE (ML) INJECTION AS NEEDED
Status: DISCONTINUED | OUTPATIENT
Start: 2019-07-21 | End: 2019-07-22 | Stop reason: HOSPADM

## 2019-07-21 RX ORDER — SODIUM CHLORIDE 0.9 % (FLUSH) 0.9 %
5-40 SYRINGE (ML) INJECTION EVERY 8 HOURS
Status: DISCONTINUED | OUTPATIENT
Start: 2019-07-21 | End: 2019-07-22 | Stop reason: HOSPADM

## 2019-07-21 RX ORDER — CEPHALEXIN 500 MG/1
500 CAPSULE ORAL 3 TIMES DAILY
Qty: 21 CAP | Refills: 0 | Status: SHIPPED | OUTPATIENT
Start: 2019-07-21 | End: 2019-07-28

## 2019-07-21 RX ORDER — ACETAMINOPHEN 325 MG/1
650 TABLET ORAL
Status: COMPLETED | OUTPATIENT
Start: 2019-07-21 | End: 2019-07-21

## 2019-07-21 RX ADMIN — ACETAMINOPHEN 650 MG: 325 TABLET ORAL at 20:46

## 2019-07-21 RX ADMIN — WATER 1 G: 1 INJECTION INTRAMUSCULAR; INTRAVENOUS; SUBCUTANEOUS at 21:09

## 2019-07-21 RX ADMIN — SODIUM CHLORIDE 500 ML: 900 INJECTION, SOLUTION INTRAVENOUS at 20:46

## 2019-07-21 NOTE — ED PROVIDER NOTES
I have evaluated the patient as the Provider in Triage. I have reviewed Her vital signs and the triage nurse assessment. I have talked with the patient and any available family and advised that I am the provider in triage and have ordered the appropriate study to initiate their work up based on the clinical presentation during my assessment. I have advised that the patient will be accommodated in the Main ED as soon as possible. I have also requested to contact the triage nurse or myself immediately if the patient experiences any changes in their condition during this brief waiting period. Note written by Vinod Hogue, as dictated by Estefany Freed MD 7:30 PM    [de-identified] y.o. Female states onset of weakness ~1 day ago, patient states her legs and arms feel weak. Patient endorses ~3 GLF within the past day. Patient endorses accompanying bowel incontinence. Patient states at baseline she is active and ambulates without assistance, and since ~1 day ago she has to \"crawl to the bathroom. \" Patient states she was here ~2 weeks ago for similar sx and reports her sx have not subsided. Note written by Vinod Hogue, as dictated by Estefany Freed MD 7:30 PM     ---  [de-identified] y.o. female with past medical history significant for HTN, hypertriglyceridemia, s/p appendectomy, s/p tubal ligation presents with chief complaint of frequent falls that started yesterday, with new onset of bladder and bowel incontinence today. The pt explains that she most recently took an unwitnessed GLF today secondary to BLE weakness, noting that she was unable to get up off of the floor. She continues to explain that she had to urinate, but was unable to go to the bathroom, which ultimately led to her urinating on herself. Upon arrival, the pt also indicates that she lost complete control of her bowels, adding that she produced diarrhea.  Of note, the pt includes that she had an MRI 2 weeks ago, and that she has since been experiencing a headache and bilateral ear pain. Pt denies being on any anticoagulation or antiplatelet therapy regimens. Pt denies any head trauma or loss of consciousness. Pt denies any fevers, coughs, or any other symptoms at this time. There are no other acute medical concerns at this time. Social hx: Patient denies Tobacco use. Denies EtOH use. Denies illicit drug abuse. PCP: Ruben Guevara MD    Note written by [employer], as dictated by Niecy Rice,  8:04 PM      The history is provided by the patient. No  was used.         Past Medical History:   Diagnosis Date    High triglycerides     Hypertension        Past Surgical History:   Procedure Laterality Date    HX APPENDECTOMY      HX KNEE REPLACEMENT      [id number]    HX TUBAL LIGATION           Family History:   Problem Relation Age of Onset   [place] Cancer Sister         cervical    COPD Brother     Hypertension Mother     Hypertension Father     Emphysema Father        Social History     Socioeconomic History    Marital status:      Spouse name: Not on file    Number of children: Not on file    Years of education: Not on file    Highest education level: Not on file   Occupational History    Not on file   Social Needs    Financial resource strain: Not on file    Food insecurity:     Worry: Not on file     Inability: Not on file    Transportation needs:     Medical: Not on file     Non-medical: Not on file   Tobacco Use    Smoking status: Never Smoker    Smokeless tobacco: Never Used   Substance and Sexual Activity    Alcohol use: No    Drug use: No    Sexual activity: Not Currently   Lifestyle    Physical activity:     Days per week: Not on file     Minutes per session: Not on file    Stress: Not on file   Relationships    Social connections:     Talks on phone: Not on file     Gets together: Not on file     Attends Catholic service: Not on file     Active member of club or organization: Not on file     Attends meetings of clubs or organizations: Not on file     Relationship status: Not on file    Intimate partner violence:     Fear of current or ex partner: Not on file     Emotionally abused: Not on file     Physically abused: Not on file     Forced sexual activity: Not on file   Other Topics Concern    Not on file   Social History Narrative    Not on file         ALLERGIES: Ampicillin and Lisinopril    Review of Systems   Constitutional: Negative for appetite change, chills, fever and unexpected weight change. HENT: Positive for ear pain (bilateral). Negative for hearing loss, rhinorrhea and trouble swallowing. Eyes: Negative for pain and visual disturbance. Respiratory: Negative for cough, chest tightness and shortness of breath. Cardiovascular: Negative for chest pain and palpitations. Gastrointestinal: Positive for diarrhea. Negative for abdominal distention, abdominal pain, blood in stool and vomiting. Positive for bowel incontinence. Genitourinary: Negative for dysuria, hematuria and urgency. Positive for bladder incontinence. Musculoskeletal: Negative for back pain and myalgias. Skin: Negative for rash. Neurological: Positive for weakness (BLE) and headaches. Negative for dizziness, syncope (no LOC) and numbness. Psychiatric/Behavioral: Negative for confusion and suicidal ideas. All other systems reviewed and are negative. Vitals:    07/21/19 1931   BP: 150/65   Pulse: 82   Resp: 14   Temp: 99.1 °F (37.3 °C)   SpO2: 96%   Weight: 59 kg (130 lb)   Height: 5' (1.524 m)            Physical Exam   Constitutional: She is oriented to person, place, and time. She appears well-developed and well-nourished. No distress. HENT:   Head: Normocephalic and atraumatic. Right Ear: External ear normal.   Left Ear: External ear normal.   Nose: Nose normal.   Mouth/Throat: Oropharynx is clear and moist. No oropharyngeal exudate.    Eyes: Pupils are equal, round, and reactive to light. Conjunctivae and EOM are normal. Right eye exhibits no discharge. Left eye exhibits no discharge. No scleral icterus. Neck: Normal range of motion. Neck supple. No JVD present. No tracheal deviation present. Cardiovascular: Normal rate, regular rhythm, normal heart sounds and intact distal pulses. Exam reveals no gallop and no friction rub. No murmur heard. Pulmonary/Chest: Effort normal. No stridor. No respiratory distress. She has decreased breath sounds in the right lower field and the left lower field. She has no wheezes. She has no rhonchi. She has no rales. She exhibits no tenderness. Abdominal: Soft. Bowel sounds are normal. She exhibits no distension. There is no tenderness. There is no rebound and no guarding. Musculoskeletal: Normal range of motion. She exhibits no edema or tenderness. Neurological: She is alert and oriented to person, place, and time. She has normal strength and normal reflexes. She displays normal reflexes. No cranial nerve deficit or sensory deficit. She exhibits normal muscle tone. Coordination normal. GCS eye subscore is 4. GCS verbal subscore is 5. GCS motor subscore is 6. Skin: Skin is warm and dry. Capillary refill takes less than 2 seconds. No rash noted. She is not diaphoretic. No erythema. No pallor. Psychiatric: She has a normal mood and affect. Her behavior is normal. Judgment and thought content normal.   Nursing note and vitals reviewed.      Note written by Francesco Newsome, as dictated by Adela Najera, DO 8:04 PM    MDM  Number of Diagnoses or Management Options  Acute cystitis with hematuria:   Fatigue, unspecified type:   Leukocytosis, unspecified type:      Amount and/or Complexity of Data Reviewed  Clinical lab tests: ordered and reviewed  Tests in the radiology section of CPT®: ordered and reviewed  Tests in the medicine section of CPT®: ordered and reviewed  Independent visualization of images, tracings, or specimens: yes (ekg)    Risk of Complications, Morbidity, and/or Mortality  Presenting problems: moderate  Diagnostic procedures: moderate  Management options: moderate    Patient Progress  Patient progress: stable       Procedures     Chief Complaint   Patient presents with    Fatigue       The patient's presenting problems have been discussed, and they are in agreement with the care plan formulated and outlined with them. I have encouraged them to ask questions as they arise throughout their visit. MEDICATIONS GIVEN:  Medications   acetaminophen (TYLENOL) tablet 650 mg (650 mg Oral Given 7/21/19 2046)   sodium chloride 0.9 % bolus infusion 500 mL (0 mL IntraVENous IV Completed 7/21/19 2146)   cefTRIAXone (ROCEPHIN) 1 g in sterile water (preservative free) 10 mL IV syringe (1 g IntraVENous Given 7/21/19 2109)       LABS REVIEWED:  Recent Results (from the past 24 hour(s))   METABOLIC PANEL, COMPREHENSIVE    Collection Time: 07/21/19  7:53 PM   Result Value Ref Range    Sodium 136 136 - 145 mmol/L    Potassium 3.6 3.5 - 5.1 mmol/L    Chloride 103 97 - 108 mmol/L    CO2 27 21 - 32 mmol/L    Anion gap 6 5 - 15 mmol/L    Glucose 150 (H) 65 - 100 mg/dL    BUN 20 6 - 20 MG/DL    Creatinine 1.09 (H) 0.55 - 1.02 MG/DL    BUN/Creatinine ratio 18 12 - 20      GFR est AA 59 (L) >60 ml/min/1.73m2    GFR est non-AA 48 (L) >60 ml/min/1.73m2    Calcium 8.8 8.5 - 10.1 MG/DL    Bilirubin, total 0.8 0.2 - 1.0 MG/DL    ALT (SGPT) 21 12 - 78 U/L    AST (SGOT) 19 15 - 37 U/L    Alk.  phosphatase 84 45 - 117 U/L    Protein, total 7.3 6.4 - 8.2 g/dL    Albumin 2.9 (L) 3.5 - 5.0 g/dL    Globulin 4.4 (H) 2.0 - 4.0 g/dL    A-G Ratio 0.7 (L) 1.1 - 2.2     CBC WITH AUTOMATED DIFF    Collection Time: 07/21/19  7:53 PM   Result Value Ref Range    WBC 22.3 (H) 3.6 - 11.0 K/uL    RBC 4.74 3.80 - 5.20 M/uL    HGB 13.1 11.5 - 16.0 g/dL    HCT 40.5 35.0 - 47.0 %    MCV 85.4 80.0 - 99.0 FL    MCH 27.6 26.0 - 34.0 PG    MCHC 32.3 30.0 - 36.5 g/dL    RDW 13.5 11.5 - 14.5 %    PLATELET 293 449 - 464 K/uL    MPV 9.1 8.9 - 12.9 FL    NRBC 0.0 0  WBC    ABSOLUTE NRBC 0.00 0.00 - 0.01 K/uL    NEUTROPHILS 85 (H) 32 - 75 %    LYMPHOCYTES 7 (L) 12 - 49 %    MONOCYTES 7 5 - 13 %    EOSINOPHILS 0 0 - 7 %    BASOPHILS 0 0 - 1 %    IMMATURE GRANULOCYTES 1 (H) 0.0 - 0.5 %    ABS. NEUTROPHILS 18.7 (H) 1.8 - 8.0 K/UL    ABS. LYMPHOCYTES 1.6 0.8 - 3.5 K/UL    ABS. MONOCYTES 1.7 (H) 0.0 - 1.0 K/UL    ABS. EOSINOPHILS 0.0 0.0 - 0.4 K/UL    ABS. BASOPHILS 0.1 0.0 - 0.1 K/UL    ABS. IMM. GRANS. 0.2 (H) 0.00 - 0.04 K/UL    DF AUTOMATED     SAMPLES BEING HELD    Collection Time: 07/21/19  7:53 PM   Result Value Ref Range    SAMPLES BEING HELD 1SST,1RED,1DRK GRN, 1BLU     COMMENT        Add-on orders for these samples will be processed based on acceptable specimen integrity and analyte stability, which may vary by analyte.    TROPONIN I    Collection Time: 07/21/19  7:53 PM   Result Value Ref Range    Troponin-I, Qt. <0.05 <0.05 ng/mL   LIPASE    Collection Time: 07/21/19  7:53 PM   Result Value Ref Range    Lipase 126 73 - 393 U/L   EKG, 12 LEAD, INITIAL    Collection Time: 07/21/19  7:53 PM   Result Value Ref Range    Ventricular Rate 80 BPM    Atrial Rate 80 BPM    P-R Interval 136 ms    QRS Duration 68 ms    Q-T Interval 372 ms    QTC Calculation (Bezet) 429 ms    Calculated P Axis 30 degrees    Calculated R Axis 21 degrees    Calculated T Axis 51 degrees    Diagnosis       Normal sinus rhythm  Nonspecific T wave abnormality  Abnormal ECG  When compared with ECG of 06-JUL-2019 21:08,  premature atrial complexes are no longer present     URINALYSIS W/MICROSCOPIC    Collection Time: 07/21/19  8:37 PM   Result Value Ref Range    Color YELLOW/STRAW      Appearance TURBID (A) CLEAR      Specific gravity 1.007 1.003 - 1.030      pH (UA) 5.5 5.0 - 8.0      Protein 100 (A) NEG mg/dL    Glucose NEGATIVE  NEG mg/dL    Ketone NEGATIVE  NEG mg/dL    Bilirubin NEGATIVE NEG      Blood MODERATE (A) NEG      Urobilinogen 0.2 0.2 - 1.0 EU/dL    Nitrites NEGATIVE  NEG      Leukocyte Esterase LARGE (A) NEG      WBC >100 (H) 0 - 4 /hpf    RBC 5-10 0 - 5 /hpf    Epithelial cells FEW FEW /lpf    Bacteria 2+ (A) NEG /hpf   URINE CULTURE HOLD SAMPLE    Collection Time: 07/21/19  8:37 PM   Result Value Ref Range    Urine culture hold        URINE ON HOLD IN MICROBIOLOGY DEPT FOR 3 DAYS. IF UNPRESERVED URINE IS SUBMITTED, IT CANNOT BE USED FOR ADDITIONAL TESTING AFTER 24 HRS, RECOLLECTION WILL BE REQUIRED. VITAL SIGNS:  Patient Vitals for the past 24 hrs:   Temp Pulse Resp BP SpO2   07/21/19 1931 99.1 °F (37.3 °C) 82 14 150/65 96 %       RADIOLOGY RESULTS:  The following have been ordered and reviewed:  Ct Head Wo Cont    Result Date: 7/21/2019  INDICATION: falls, trouble walking EXAM: CT HEAD without contrast. CT dose reduction was achieved through use of a standardized protocol tailored for this examination and automatic exposure control for dose modulation. FINDINGS: Unenhanced CT Head is performed. The brain parenchyma is unremarkable in appearance for age, without evidence for infarct. There is no bleed, mass, shift, hydrocephalus or extra-axial fluid collection. Bone windows are unremarkable. IMPRESSION: No Intracranial Disease Evident on Head CT. ED EKG interpretation:  Rhythm: normal sinus rhythm; and regular . Rate (approx.): 80; Axis: normal; P wave: normal; QRS interval: normal ; ST/T wave: non-specific changes; Other findings: abnormal ekg. This EKG was interpreted by Carmen Bass DO, ED Provider. PROGRESS NOTES:  9:37 PM  The patient has been updated on results, and has been planned for discharge. Instructed the patient to follow-up with PCP, and to return to the ED should any new symptoms arise or worsen. Patient agreeable to plan. 9:37 PM  Patient's results have been reviewed with them.   Patient and/or family have verbally conveyed their understanding and agreement of the patient's signs, symptoms, diagnosis, treatment and prognosis and additionally agree to follow up as recommended or return to the Emergency Room should their condition change prior to follow-up. Discharge instructions have also been provided to the patient with some educational information regarding their diagnosis as well a list of reasons why they would want to return to the ER prior to their follow-up appointment should their condition change. DIAGNOSIS:    1. Acute cystitis with hematuria    2. Fatigue, unspecified type    3. Leukocytosis, unspecified type        PLAN:  Follow-up Information     Follow up With Specialties Details Why Contact Info    Aurelio Bob MD Internal Medicine Schedule an appointment as soon as possible for a visit  [address] [place] [address] [phone]      [facility] EMERGENCY DEPT Emergency Medicine  If symptoms worsen [address] [facility]  [phone]        Discharge Medication List as of 7/21/2019  9:35 PM      START taking these medications    Details   cephALEXin (KEFLEX) 500 mg capsule Take 1 Cap by mouth three (3) times daily for 7 days. , Print, Disp-21 Cap, R-0         CONTINUE these medications which have NOT CHANGED    Details   co-enzyme Q-10 (CO Q-10) 100 mg capsule Take 100 mg by mouth daily. , Historical Med      omega 3-DHA-EPA-fish oil 1,000 mg (120 mg-180 mg) capsule Take 1 Cap by mouth daily. , Historical Med      magnesium oxide (MAG-OX) 400 mg tablet Take 400 mg by mouth daily. , Historical Med      b complex vitamins tablet Take 1 Tab by mouth daily. , Historical Med      cholecalciferol (VITAMIN D3) 1,000 unit tablet Take 1,000 Units by mouth daily. , Historical Med             ED COURSE: The patient's hospital course has been uncomplicated.

## 2019-07-22 LAB
ATRIAL RATE: 80 BPM
CALCULATED P AXIS, ECG09: 30 DEGREES
CALCULATED R AXIS, ECG10: 21 DEGREES
CALCULATED T AXIS, ECG11: 51 DEGREES
DIAGNOSIS, 93000: NORMAL
P-R INTERVAL, ECG05: 136 MS
Q-T INTERVAL, ECG07: 372 MS
QRS DURATION, ECG06: 68 MS
QTC CALCULATION (BEZET), ECG08: 429 MS
VENTRICULAR RATE, ECG03: 80 BPM

## 2019-07-22 NOTE — DISCHARGE INSTRUCTIONS
We hope that we have addressed all of your medical concerns. The examination and treatment you received in the Emergency Department were for an emergent problem and were not intended as complete care. It is important that you follow up with your healthcare provider(s) for ongoing care. If your symptoms worsen or do not improve as expected, and you are unable to reach your usual health care provider(s), you should return to the Emergency Department. Today's healthcare is undergoing tremendous change, and patient satisfaction surveys are one of the many tools to assess the quality of medical care. You may receive a survey from the [employer] regarding your experience in the Emergency Department. I hope that your experience has been completely positive, particularly the medical care that I provided. As such, please participate in the survey; anything less than excellent does not meet my expectations or intentions. [facility] and 8 [facility] participate in nationally recognized quality of care measures. If your blood pressure is greater than 120/80, as reported below, we urge that you seek medical care to address the potential of high blood pressure, commonly known as hypertension. Hypertension can be hereditary or can be caused by certain medical conditions, pain, stress, or \"white coat syndrome. \"       Please make an appointment with your health care provider(s) for follow up of your Emergency Department visit. VITALS:   Patient Vitals for the past 8 hrs:   Temp Pulse Resp BP SpO2   07/21/19 1931 99.1 °F (37.3 °C) 82 14 150/65 96 %          Thank you for allowing us to provide you with medical care today. We realize that you have many choices for your emergency care needs. Please choose us in the future for any continued health care needs. Josh Fuentes  [facility], [address]. Office: [phone]            Recent Results (from the past 24 hour(s))   METABOLIC PANEL, COMPREHENSIVE    Collection Time: 07/21/19  7:53 PM   Result Value Ref Range    Sodium 136 136 - 145 mmol/L    Potassium 3.6 3.5 - 5.1 mmol/L    Chloride 103 97 - 108 mmol/L    CO2 27 21 - 32 mmol/L    Anion gap 6 5 - 15 mmol/L    Glucose 150 (H) 65 - 100 mg/dL    BUN 20 6 - 20 MG/DL    Creatinine 1.09 (H) 0.55 - 1.02 MG/DL    BUN/Creatinine ratio 18 12 - 20      GFR est AA 59 (L) >60 ml/min/1.73m2    GFR est non-AA 48 (L) >60 ml/min/1.73m2    Calcium 8.8 8.5 - 10.1 MG/DL    Bilirubin, total 0.8 0.2 - 1.0 MG/DL    ALT (SGPT) 21 12 - 78 U/L    AST (SGOT) 19 15 - 37 U/L    Alk. phosphatase 84 45 - 117 U/L    Protein, total 7.3 6.4 - 8.2 g/dL    Albumin 2.9 (L) 3.5 - 5.0 g/dL    Globulin 4.4 (H) 2.0 - 4.0 g/dL    A-G Ratio 0.7 (L) 1.1 - 2.2     CBC WITH AUTOMATED DIFF    Collection Time: 07/21/19  7:53 PM   Result Value Ref Range    WBC 22.3 (H) 3.6 - 11.0 K/uL    RBC 4.74 3.80 - 5.20 M/uL    HGB 13.1 11.5 - 16.0 g/dL    HCT 40.5 35.0 - 47.0 %    MCV 85.4 80.0 - 99.0 FL    MCH 27.6 26.0 - 34.0 PG    MCHC 32.3 30.0 - 36.5 g/dL    RDW 13.5 11.5 - 14.5 %    PLATELET 312 527 - 575 K/uL    MPV 9.1 8.9 - 12.9 FL    NRBC 0.0 0  WBC    ABSOLUTE NRBC 0.00 0.00 - 0.01 K/uL    NEUTROPHILS 85 (H) 32 - 75 %    LYMPHOCYTES 7 (L) 12 - 49 %    MONOCYTES 7 5 - 13 %    EOSINOPHILS 0 0 - 7 %    BASOPHILS 0 0 - 1 %    IMMATURE GRANULOCYTES 1 (H) 0.0 - 0.5 %    ABS. NEUTROPHILS 18.7 (H) 1.8 - 8.0 K/UL    ABS. LYMPHOCYTES 1.6 0.8 - 3.5 K/UL    ABS. MONOCYTES 1.7 (H) 0.0 - 1.0 K/UL    ABS. EOSINOPHILS 0.0 0.0 - 0.4 K/UL    ABS. BASOPHILS 0.1 0.0 - 0.1 K/UL    ABS. IMM.  GRANS. 0.2 (H) 0.00 - 0.04 K/UL    DF AUTOMATED     SAMPLES BEING HELD    Collection Time: 07/21/19  7:53 PM   Result Value Ref Range    SAMPLES BEING HELD 1SST,1RED,1DRK GRN, 1BLU     COMMENT        Add-on orders for these samples will be processed based on acceptable specimen integrity and analyte stability, which may vary by analyte. TROPONIN I    Collection Time: 07/21/19  7:53 PM   Result Value Ref Range    Troponin-I, Qt. <0.05 <0.05 ng/mL   LIPASE    Collection Time: 07/21/19  7:53 PM   Result Value Ref Range    Lipase 126 73 - 393 U/L   EKG, 12 LEAD, INITIAL    Collection Time: 07/21/19  7:53 PM   Result Value Ref Range    Ventricular Rate 80 BPM    Atrial Rate 80 BPM    P-R Interval 136 ms    QRS Duration 68 ms    Q-T Interval 372 ms    QTC Calculation (Bezet) 429 ms    Calculated P Axis 30 degrees    Calculated R Axis 21 degrees    Calculated T Axis 51 degrees    Diagnosis       Normal sinus rhythm  Nonspecific T wave abnormality  Abnormal ECG  When compared with ECG of 06-JUL-2019 21:08,  premature atrial complexes are no longer present     URINALYSIS W/MICROSCOPIC    Collection Time: 07/21/19  8:37 PM   Result Value Ref Range    Color YELLOW/STRAW      Appearance TURBID (A) CLEAR      Specific gravity 1.007 1.003 - 1.030      pH (UA) 5.5 5.0 - 8.0      Protein 100 (A) NEG mg/dL    Glucose NEGATIVE  NEG mg/dL    Ketone NEGATIVE  NEG mg/dL    Bilirubin NEGATIVE  NEG      Blood MODERATE (A) NEG      Urobilinogen 0.2 0.2 - 1.0 EU/dL    Nitrites NEGATIVE  NEG      Leukocyte Esterase LARGE (A) NEG      WBC >100 (H) 0 - 4 /hpf    RBC 5-10 0 - 5 /hpf    Epithelial cells FEW FEW /lpf    Bacteria 2+ (A) NEG /hpf   URINE CULTURE HOLD SAMPLE    Collection Time: 07/21/19  8:37 PM   Result Value Ref Range    Urine culture hold        URINE ON HOLD IN MICROBIOLOGY DEPT FOR 3 DAYS. IF UNPRESERVED URINE IS SUBMITTED, IT CANNOT BE USED FOR ADDITIONAL TESTING AFTER 24 HRS, RECOLLECTION WILL BE REQUIRED. Ct Head Wo Cont    Result Date: 7/21/2019  INDICATION: falls, trouble walking EXAM: CT HEAD without contrast. CT dose reduction was achieved through use of a standardized protocol tailored for this examination and automatic exposure control for dose modulation.  FINDINGS: Unenhanced CT Head is performed. The brain parenchyma is unremarkable in appearance for age, without evidence for infarct. There is no bleed, mass, shift, hydrocephalus or extra-axial fluid collection. Bone windows are unremarkable. IMPRESSION: No Intracranial Disease Evident on Head CT. Patient Education        Urinary Tract Infection in Women: Care Instructions  Your Care Instructions    A urinary tract infection, or UTI, is a general term for an infection anywhere between the kidneys and the urethra (where urine comes out). Most UTIs are bladder infections. They often cause pain or burning when you urinate. UTIs are caused by bacteria and can be cured with antibiotics. Be sure to complete your treatment so that the infection goes away. Follow-up care is a key part of your treatment and safety. Be sure to make and go to all appointments, and call your doctor if you are having problems. It's also a good idea to know your test results and keep a list of the medicines you take. How can you care for yourself at home? · Take your antibiotics as directed. Do not stop taking them just because you feel better. You need to take the full course of antibiotics. · Drink extra water and other fluids for the next day or two. This may help wash out the bacteria that are causing the infection. (If you have kidney, heart, or liver disease and have to limit fluids, talk with your doctor before you increase your fluid intake.)  · Avoid drinks that are carbonated or have caffeine. They can irritate the bladder. · Urinate often. Try to empty your bladder each time. · To relieve pain, take a hot bath or lay a heating pad set on low over your lower belly or genital area. Never go to sleep with a heating pad in place. To prevent UTIs  · Drink plenty of water each day. This helps you urinate often, which clears bacteria from your system.  (If you have kidney, heart, or liver disease and have to limit fluids, talk with your doctor before you increase your fluid intake.)  · Urinate when you need to. · Urinate right after you have sex. · Change sanitary pads often. · Avoid douches, bubble baths, feminine hygiene sprays, and other feminine hygiene products that have deodorants. · After going to the bathroom, wipe from front to back. When should you call for help? Call your doctor now or seek immediate medical care if:    · Symptoms such as fever, chills, nausea, or vomiting get worse or appear for the first time.     · You have new pain in your back just below your rib cage. This is called flank pain.     · There is new blood or pus in your urine.     · You have any problems with your antibiotic medicine.    Watch closely for changes in your health, and be sure to contact your doctor if:    · You are not getting better after taking an antibiotic for 2 days.     · Your symptoms go away but then come back. Where can you learn more? Go to http://jay-kasie.info/. Enter O643 in the search box to learn more about \"Urinary Tract Infection in Women: Care Instructions. \"  Current as of: December 19, 2018  Content Version: 12.1  © 1360-4628 [employer]. Care instructions adapted under license by [employer] (which disclaims liability or warranty for this information). If you have questions about a medical condition or this instruction, always ask your healthcare professional. [place] [address] any warranty or liability for your use of this information. Patient Education        Fatigue: Care Instructions  Your Care Instructions    Fatigue is a feeling of tiredness, exhaustion, or lack of energy. You may feel fatigue because of too much or not enough activity. It can also come from stress, lack of sleep, boredom, and poor diet. Many medical problems, such as viral infections, can cause fatigue.  Emotional problems, especially depression, are often the cause of fatigue. Fatigue is most often a symptom of another problem. Treatment for fatigue depends on the cause. For example, if you have fatigue because you have a certain health problem, treating this problem also treats your fatigue. If depression or anxiety is the cause, treatment may help. Follow-up care is a key part of your treatment and safety. Be sure to make and go to all appointments, and call your doctor if you are having problems. It's also a good idea to know your test results and keep a list of the medicines you take. How can you care for yourself at home? · Get regular exercise. But don't overdo it. Go back and forth between rest and exercise. · Get plenty of rest.  · Eat a healthy diet. Do not skip meals, especially breakfast.  · Reduce your use of caffeine, tobacco, and alcohol. Caffeine is most often found in coffee, tea, cola drinks, and chocolate. · Limit medicines that can cause fatigue. This includes tranquilizers and cold and allergy medicines. When should you call for help? Watch closely for changes in your health, and be sure to contact your doctor if:    · You have new symptoms such as fever or a rash.     · Your fatigue gets worse.     · You have been feeling down, depressed, or hopeless. Or you may have lost interest in things that you usually enjoy.     · You are not getting better as expected. Where can you learn more? Go to http://jay-kasie.info/. Enter L082 in the search box to learn more about \"Fatigue: Care Instructions. \"  Current as of: September 23, 2018  Content Version: 12.1  © 2025-5310 [employer]. Care instructions adapted under license by [employer] (which disclaims liability or warranty for this information). If you have questions about a medical condition or this instruction, always ask your healthcare professional. [address] any warranty or liability for your use of this information. Patient Education        Learning About High White Blood Cell Counts  What are white blood cells? White blood cells (leukocytes) help protect your body from infection. Normally, when germs get inside your body, your body makes more white blood cells that search for and destroy the germs. Less often, there are medical problems where the body may make a lot more white blood cells than it needs. What happens when you have a high white blood cell count? Your white blood cell count may be high because your body is fighting an infection. But other things can cause it, such as some medicines, burns, an illness, or other health problems. When your doctor sees that your white blood cell count is high, he or she will try to find out why, and then treat the cause. What are the symptoms? A high white blood cell count alone doesn't cause any symptoms. The symptoms you feel may come from the medical problem that your white blood cells are fighting. For example, if you have pneumonia, you may have a fever and trouble breathing. These are symptoms of pneumonia, not of a high white blood cell count. How is it treated? · Your doctor may do more tests to find the problem that's making your white blood cell count high. Once your doctor finds the problem, he or she may be able to treat it. · Part of your treatment may be telling your doctor if you feel worse. Watch your temperature, and call your doctor if your fever goes up and stays up. Follow-up care is a key part of your treatment and safety. Be sure to make and go to all appointments, and call your doctor if you are having problems. It's also a good idea to know your test results and keep a list of the medicines you take. Where can you learn more? Go to http://jay-kasie.info/. Enter S567 in the search box to learn more about \"Learning About High White Blood Cell Counts. \"  Current as of: March 28, 2019  Content Version: 12.1  © 2121-0105 [employer]. Care instructions adapted under license by [employer] (which disclaims liability or warranty for this information). If you have questions about a medical condition or this instruction, always ask your healthcare professional. [address] any warranty or liability for your use of this information.

## 2019-07-24 LAB
BACTERIA SPEC CULT: ABNORMAL
CC UR VC: ABNORMAL
SERVICE CMNT-IMP: ABNORMAL

## 2021-12-14 ENCOUNTER — OFFICE VISIT (OUTPATIENT)
Dept: FAMILY MEDICINE CLINIC | Age: 81
End: 2021-12-14
Payer: MEDICARE

## 2021-12-14 VITALS
RESPIRATION RATE: 17 BRPM | HEART RATE: 76 BPM | BODY MASS INDEX: 24.74 KG/M2 | SYSTOLIC BLOOD PRESSURE: 161 MMHG | OXYGEN SATURATION: 96 % | WEIGHT: 126 LBS | DIASTOLIC BLOOD PRESSURE: 63 MMHG | HEIGHT: 60 IN | TEMPERATURE: 97.5 F

## 2021-12-14 DIAGNOSIS — Z71.89 ADVANCED DIRECTIVES, COUNSELING/DISCUSSION: ICD-10-CM

## 2021-12-14 DIAGNOSIS — E78.5 HYPERLIPIDEMIA, UNSPECIFIED HYPERLIPIDEMIA TYPE: ICD-10-CM

## 2021-12-14 DIAGNOSIS — Z00.00 INITIAL MEDICARE ANNUAL WELLNESS VISIT: Primary | ICD-10-CM

## 2021-12-14 DIAGNOSIS — R73.03 PREDIABETES: ICD-10-CM

## 2021-12-14 DIAGNOSIS — Z13.31 SCREENING FOR DEPRESSION: ICD-10-CM

## 2021-12-14 DIAGNOSIS — Z13.39 SCREENING FOR ALCOHOLISM: ICD-10-CM

## 2021-12-14 DIAGNOSIS — Z91.81 AT HIGH RISK FOR FALLS: ICD-10-CM

## 2021-12-14 DIAGNOSIS — R79.89 ELEVATED SERUM CREATININE: ICD-10-CM

## 2021-12-14 DIAGNOSIS — Z13.1 SCREENING FOR DIABETES MELLITUS: ICD-10-CM

## 2021-12-14 DIAGNOSIS — I10 HYPERTENSION, UNSPECIFIED TYPE: ICD-10-CM

## 2021-12-14 PROCEDURE — G8754 DIAS BP LESS 90: HCPCS | Performed by: STUDENT IN AN ORGANIZED HEALTH CARE EDUCATION/TRAINING PROGRAM

## 2021-12-14 PROCEDURE — G8753 SYS BP > OR = 140: HCPCS | Performed by: STUDENT IN AN ORGANIZED HEALTH CARE EDUCATION/TRAINING PROGRAM

## 2021-12-14 PROCEDURE — G8536 NO DOC ELDER MAL SCRN: HCPCS | Performed by: STUDENT IN AN ORGANIZED HEALTH CARE EDUCATION/TRAINING PROGRAM

## 2021-12-14 PROCEDURE — G8420 CALC BMI NORM PARAMETERS: HCPCS | Performed by: STUDENT IN AN ORGANIZED HEALTH CARE EDUCATION/TRAINING PROGRAM

## 2021-12-14 PROCEDURE — G8427 DOCREV CUR MEDS BY ELIG CLIN: HCPCS | Performed by: STUDENT IN AN ORGANIZED HEALTH CARE EDUCATION/TRAINING PROGRAM

## 2021-12-14 PROCEDURE — 93010 ELECTROCARDIOGRAM REPORT: CPT | Performed by: STUDENT IN AN ORGANIZED HEALTH CARE EDUCATION/TRAINING PROGRAM

## 2021-12-14 PROCEDURE — 1101F PT FALLS ASSESS-DOCD LE1/YR: CPT | Performed by: STUDENT IN AN ORGANIZED HEALTH CARE EDUCATION/TRAINING PROGRAM

## 2021-12-14 PROCEDURE — G0438 PPPS, INITIAL VISIT: HCPCS | Performed by: STUDENT IN AN ORGANIZED HEALTH CARE EDUCATION/TRAINING PROGRAM

## 2021-12-14 PROCEDURE — 1090F PRES/ABSN URINE INCON ASSESS: CPT | Performed by: STUDENT IN AN ORGANIZED HEALTH CARE EDUCATION/TRAINING PROGRAM

## 2021-12-14 PROCEDURE — G0463 HOSPITAL OUTPT CLINIC VISIT: HCPCS | Performed by: STUDENT IN AN ORGANIZED HEALTH CARE EDUCATION/TRAINING PROGRAM

## 2021-12-14 PROCEDURE — G8432 DEP SCR NOT DOC, RNG: HCPCS | Performed by: STUDENT IN AN ORGANIZED HEALTH CARE EDUCATION/TRAINING PROGRAM

## 2021-12-14 PROCEDURE — 99214 OFFICE O/P EST MOD 30 MIN: CPT | Performed by: STUDENT IN AN ORGANIZED HEALTH CARE EDUCATION/TRAINING PROGRAM

## 2021-12-14 PROCEDURE — G0442 ANNUAL ALCOHOL SCREEN 15 MIN: HCPCS | Performed by: STUDENT IN AN ORGANIZED HEALTH CARE EDUCATION/TRAINING PROGRAM

## 2021-12-14 PROCEDURE — 93005 ELECTROCARDIOGRAM TRACING: CPT | Performed by: STUDENT IN AN ORGANIZED HEALTH CARE EDUCATION/TRAINING PROGRAM

## 2021-12-14 PROCEDURE — G8400 PT W/DXA NO RESULTS DOC: HCPCS | Performed by: STUDENT IN AN ORGANIZED HEALTH CARE EDUCATION/TRAINING PROGRAM

## 2021-12-14 RX ORDER — LOSARTAN POTASSIUM 50 MG/1
TABLET ORAL DAILY
COMMUNITY
End: 2021-12-14

## 2021-12-14 RX ORDER — LOSARTAN POTASSIUM 50 MG/1
50 TABLET ORAL DAILY
Qty: 30 TABLET | Refills: 4 | Status: SHIPPED | OUTPATIENT
Start: 2021-12-14 | End: 2022-01-11

## 2021-12-14 RX ORDER — HYDROCHLOROTHIAZIDE 25 MG/1
25 TABLET ORAL DAILY
Qty: 30 TABLET | Refills: 3 | Status: SHIPPED | OUTPATIENT
Start: 2021-12-14 | End: 2022-01-13

## 2021-12-14 RX ORDER — HYDROCHLOROTHIAZIDE 25 MG/1
25 TABLET ORAL DAILY
COMMUNITY
End: 2021-12-14 | Stop reason: SDUPTHER

## 2021-12-14 NOTE — PROGRESS NOTES
Subjective:   Sarah Vallecillo is an 78 y.o. female who presents for establishing care and medicare wellness visit. Doing well. No complaints. Diet:  Eats fish, vegetables and fruits. Eats cheese and occasional milk. Does not eat any red meat. Exercise: Master  covers 2 acres. Does gardening up to 5 times per week. HTN:   Takes Losartan and HCTZ. Patient is compliant with medication. Follows a low sodium diet and occasionally checks BP at home. It is usually 115-125's SBP. Hyperlipidemia/ Prediabetes:  Diagnosed in the past. Does not follow any specific treatment. Has never seen an nutritionist and does not want to be referred to anyone. I personally reviewed previous lipid panel (LDL: 123), A1C (6.2%- prediabetes) and BMP revealed elevated creatinine noted below.     Lab Results   Component Value Date/Time    Cholesterol, total 198 07/07/2019 05:43 AM    HDL Cholesterol 54 07/07/2019 05:43 AM    LDL, calculated 123 (H) 07/07/2019 05:43 AM    VLDL, calculated 21 07/07/2019 05:43 AM    Triglyceride 105 07/07/2019 05:43 AM    CHOL/HDL Ratio 3.7 07/07/2019 05:43 AM     Lab Results   Component Value Date/Time    Hemoglobin A1c 6.2 07/07/2019 05:43 AM     Lab Results   Component Value Date/Time    Sodium 136 07/21/2019 07:53 PM    Potassium 3.6 07/21/2019 07:53 PM    Chloride 103 07/21/2019 07:53 PM    CO2 27 07/21/2019 07:53 PM    Anion gap 6 07/21/2019 07:53 PM    Glucose 150 (H) 07/21/2019 07:53 PM    BUN 20 07/21/2019 07:53 PM    Creatinine 1.09 (H) 07/21/2019 07:53 PM    BUN/Creatinine ratio 18 07/21/2019 07:53 PM    GFR est AA 59 (L) 07/21/2019 07:53 PM    GFR est non-AA 48 (L) 07/21/2019 07:53 PM    Calcium 8.8 07/21/2019 07:53 PM         Immunizations - reviewed:   Immunization History   Administered Date(s) Administered    Rabies Immune Globulin 02/16/2018    Rabies- IM Fibroblast Culture 02/16/2018, 02/19/2018, 02/23/2018, 03/02/2018    Tdap 02/15/2018     Flu: None, declines   Tdap: Up to date   Pneumovax: Declines  Shingrix: Declines    Health Maintenance reviewed -  Colonoscopy: N/A  DEXA scan (62yo) Declines  HIV testing Declines  Hepatitis C testing Declines  Lung cancer screening N/A  AAA screening: N/A    Allergies - reviewed: Allergies   Allergen Reactions    Amlodipine Other (comments)     Caused her teeth to rot    Ampicillin Contact Dermatitis    Lisinopril Unknown (comments)     Mind confusion                 Medications - reviewed:  Current Outpatient Medications   Medication Sig    losartan (COZAAR) 50 mg tablet Take 1 Tablet by mouth daily for 30 days.  hydroCHLOROthiazide (HYDRODIURIL) 25 mg tablet Take 1 Tablet by mouth daily for 30 days.  b complex vitamins tablet Take 1 Tab by mouth daily. No current facility-administered medications for this visit. Past Medical History - reviewed:  Past Medical History:   Diagnosis Date    High triglycerides     Hypertension          Past Surgical History - reviewed:  Past Surgical History:   Procedure Laterality Date    HX APPENDECTOMY      HX KNEE REPLACEMENT Bilateral     [id number]    HX TUBAL LIGATION           Family History - reviewed:  Family History   Problem Relation Age of Onset   [facility] Cancer Sister         cervical    COPD Brother     Hypertension Mother     Hypertension Father     Emphysema Father          Social History - reviewed:  Social History     Socioeconomic History    Marital status:      Spouse name: Not on file    Number of children: Not on file    Years of education: Not on file    Highest education level: Not on file   Occupational History    Not on file   Tobacco Use    Smoking status: Never Smoker    Smokeless tobacco: Never Used   Substance and Sexual Activity    Alcohol use:  Yes    Drug use: No    Sexual activity: Not Currently   Other Topics Concern    Not on file   Social History Narrative    Not on file     Social Determinants of Health Financial Resource Strain:     Difficulty of Paying Living Expenses: Not on file   Food Insecurity:     Worried About Running Out of Food in the Last Year: Not on file    Courtney of Food in the Last Year: Not on file   Transportation Needs:     Lack of Transportation (Medical): Not on file    Lack of Transportation (Non-Medical): Not on file   Physical Activity:     Days of Exercise per Week: Not on file    Minutes of Exercise per Session: Not on file   Stress:     Feeling of Stress : Not on file   Social Connections:     Frequency of Communication with Friends and Family: Not on file    Frequency of Social Gatherings with Friends and Family: Not on file    Attends [employer] Services: Not on file    Active Member of [address] [place] [address] [employer] or Organizations: Not on file    Attends Club or Organization Meetings: Not on file    Marital Status: Not on file   Intimate Partner Violence:     Fear of Current or Ex-Partner: Not on file    Emotionally Abused: Not on file    Physically Abused: Not on file    Sexually Abused: Not on file   Housing Stability:     Unable to Pay for Housing in the Last Year: Not on file    Number of [place] in the Last Year: Not on file    Unstable Housing in the Last Year: Not on file         Objective:     Visit Vitals  BP (!) 161/63   Pulse 76   Temp 97.5 °F (36.4 °C) (Temporal)   Resp 17   Ht 5' (1.524 m)   Wt 126 lb (57.2 kg)   SpO2 96%   BMI 24.61 kg/m²       Physical Exam  Constitutional:       Appearance: Normal appearance. Cardiovascular:      Rate and Rhythm: Regular rhythm. Heart sounds: Normal heart sounds. Pulmonary:      Breath sounds: Normal breath sounds. Musculoskeletal:         General: No swelling. Neurological:      Mental Status: She is alert.           This is a \"Initial to [facility] Visit\"  Initial Preventive Physical Examination (IPPE) providing Personalized Prevention Plan Services (Performed in the first 12 months of enrollment)    I have reviewed the patient's medical history in detail and updated the computerized patient record. Assessment/Plan   Education and counseling provided:  Are appropriate based on today's review and evaluation    1. Initial Medicare annual wellness visit  -     [place] ANNUAL ALCOHOL SCREEN 15 MIN  -     AMB POC EKG ROUTINE W/ 12 LEADS, INTER & REP  2. Hyperlipidemia, unspecified hyperlipidemia type  -     LIPID PANEL; Future  3. Advanced directives, counseling/discussion  -     DO NOT RESUSCITATE  4. Screening for diabetes mellitus  5. Screening for alcoholism  6. Screening for depression  -     DEPRESSION SCREEN ANNUAL  7. Prediabetes  -     HEMOGLOBIN A1C WITH EAG; Future  -     METABOLIC PANEL, BASIC; Future  -     MICROALBUMIN, UR, RAND W/ MICROALB/CREAT RATIO; Future  8. Hypertension, unspecified type  -     losartan (COZAAR) 50 mg tablet; Take 1 Tablet by mouth daily for 30 days. , Normal, Disp-30 Tablet, R-4  -     hydroCHLOROthiazide (HYDRODIURIL) 25 mg tablet; Take 1 Tablet by mouth daily for 30 days. , Normal, Disp-30 Tablet, R-3  9. At high risk for falls  Comments:  Declined referral to PT   10. Elevated serum creatinine     Patient is at increased fall risk and declined PT referral.  Patient declined all vaccines. Fully COVID vaccinated with Booster. HTN: may be situational as patient stated she just received \"really bad news\". She states she typically checks BP at home and it ranges from It is usually 115-125's SBP.  - Continue home medications as above. - Continue with regular exercise and with low sodium diet. - Continue checking BP at home. Can contact clinic if >150/90. Patient is aware.      General Health Questions   -During the past 4 weeks:              -how would you rate your health in general? Good              -how often have you been bothered by feeling dizzy when standing up? 1-2 per week              -how much have you been bothered by bodily pain? moderately              -Have you noticed any hearing difficulties? yes              -has your physical and emotional health limited your social activities with family or friends? yes     Emotional Health Questions   -Do you have a history of depression, anxiety, or emotional problems? no  -Over the past 2 weeks, have you felt down, depressed or hopeless? no  -Over the past 2 weeks, have you felt little interest or pleasure in doing things? no     Health Habits   Please describe your diet habits: eats healthy  Do you get 5 servings of fruits or vegetables daily? yes  Do you exercise regularly? yes     Activities of Daily Living and Functional Status   -Do you need help with eating, walking, dressing, bathing, toileting, the phone, transportation, shopping, preparing meals, housework, laundry, medications or managing money? no  -In the past four weeks, was someone available to help you if you needed and wanted help with anything? yes  -Are you confident are you that you can control and manage most of your health problems? yes  -Have you been given information to help you keep track of your medications? no  -How often do you have trouble taking your medications as prescribed? never     Fall Risk and Home Safety   Have you fallen 2 or more times in the past year? Yes 3x  Does your home have rugs in the hallways? yes, Do you have grab bars in the bathrooms?  no, Does your home have handrails on the stairs? no, Do you have adequate lighting in your home? yes  Do you have smoke detectors and check them regularly? yes  Do you have difficulties driving a car/vehicle? no  Do you always fasten your seat belt when you are in a car? no    Fall Risk Screen:  Fall Risk Assessment, last 12 mths 12/14/2021   Able to walk? Yes   Fall in past 12 months? 1   Do you feel unsteady? 1   Are you worried about falling 0   Is the gait abnormal? 1   Number of falls in past 12 months 2   Fall with injury?  1      Abuse Screen:  Patient is not abused       Screening EKG   EKG order placed: Yes    End of Life Planning   Advanced care planning directives were discussed with the patient and /or family/caregiver. Health Maintenance Due     Health Maintenance Due   Topic Date Due    COVID-19 Vaccine (1) Never done    Shingrix Vaccine Age 50> (1 of 2) Never done    Bone Densitometry (Dexa) Screening  Never done    Pneumococcal 65+ years (1 of 1 - PPSV23) Never done    Flu Vaccine (1) Never done       Patient Care Team   Patient Care Team:  Donta Robles MD as PCP - General (Family Medicine)    History     Past Medical History:   Diagnosis Date    High triglycerides     Hypertension       Past Surgical History:   Procedure Laterality Date    HX APPENDECTOMY      HX KNEE REPLACEMENT Bilateral     [id number]    HX TUBAL LIGATION       Current Outpatient Medications   Medication Sig Dispense Refill    losartan (COZAAR) 50 mg tablet Take 1 Tablet by mouth daily for 30 days. 30 Tablet 4    hydroCHLOROthiazide (HYDRODIURIL) 25 mg tablet Take 1 Tablet by mouth daily for 30 days. 30 Tablet 3    b complex vitamins tablet Take 1 Tab by mouth daily.        Allergies   Allergen Reactions    Amlodipine Other (comments)     Caused her teeth to rot    Ampicillin Contact Dermatitis    Lisinopril Unknown (comments)     Mind confusion               Family History   Problem Relation Age of Onset    Cancer Sister         cervical    COPD Brother     Hypertension Mother     Hypertension Father     Emphysema Father      Social History     Tobacco Use    Smoking status: Never Smoker    Smokeless tobacco: Never Used   Substance Use Topics    Alcohol use: Yes       Sosa Li MD

## 2021-12-14 NOTE — ACP (ADVANCE CARE PLANNING)
Advance Care Planning     General Advance Care Planning (ACP) Conversation      Date of Conversation: 12/14/2021  Conducted with: Patient with Decision Making Capacity    Healthcare Decision Maker:   No healthcare decision makers have been documented. Click here to complete [address] including selection of the Healthcare Decision Maker Relationship (ie \"Primary\")      Content/Action Overview:    Has ACP document(s) on file - reflects the patient's care preferences  Reviewed DNR/DNI and patient confirms current DNR status - completed forms on file (place new order if needed)  Topics discussed: benefit/burden of treatment options and resuscitation preferences  We also discussed who can also be primary decision maker    Length of Voluntary ACP Conversation in minutes:  <16 minutes (Non-Billable)    Elkin Julio MD

## 2021-12-14 NOTE — PATIENT INSTRUCTIONS
Medicare Wellness Visit, Female     The best way to live healthy is to have a lifestyle where you eat a well-balanced diet, exercise regularly, limit alcohol use, and quit all forms of tobacco/nicotine, if applicable. Regular preventive services are another way to keep healthy. Preventive services (vaccines, screening tests, monitoring & exams) can help personalize your care plan, which helps you manage your own care. Screening tests can find health problems at the earliest stages, when they are easiest to treat. [facility] follows the current, evidence-based guidelines published by the [facility] Yanick Monreal ([facility]) when recommending preventive services for our patients. Because we follow these guidelines, sometimes recommendations change over time as research supports it. (For example, mammograms used to be recommended annually. Even though Medicare will still pay for an annual mammogram, the newer guidelines recommend a mammogram every two years for women of average risk). Of course, you and your doctor may decide to screen more often for some diseases, based on your risk and your co-morbidities (chronic disease you are already diagnosed with). Preventive services for you include:  - Medicare offers their members a free annual wellness visit, which is time for you and your primary care provider to discuss and plan for your preventive service needs. Take advantage of this benefit every year!  -All adults over the age of 72 should receive the recommended pneumonia vaccines. Current USPSTF guidelines recommend a series of two vaccines for the best pneumonia protection.   -All adults should have a flu vaccine yearly and a tetanus vaccine every 10 years.   -All adults age 48 and older should receive the shingles vaccines (series of two vaccines).       -All adults age 38-68 who are overweight should have a diabetes screening test once every three years.   -All adults born between [birth year] and 1967 should be screened once for Hepatitis C.  -Other screening tests and preventive services for persons with diabetes include: an eye exam to screen for diabetic retinopathy, a kidney function test, a foot exam, and stricter control over your cholesterol.   -Cardiovascular screening for adults with routine risk involves an electrocardiogram (ECG) at intervals determined by your doctor.   -Colorectal cancer screenings should be done for adults age 54-65 with no increased risk factors for colorectal cancer. There are a number of acceptable methods of screening for this type of cancer. Each test has its own benefits and drawbacks. Discuss with your doctor what is most appropriate for you during your annual wellness visit. The different tests include: colonoscopy (considered the best screening method), a fecal occult blood test, a fecal DNA test, and sigmoidoscopy.    -A bone mass density test is recommended when a woman turns 65 to screen for osteoporosis. This test is only recommended one time, as a screening. Some providers will use this same test as a disease monitoring tool if you already have osteoporosis. -Breast cancer screenings are recommended every other year for women of normal risk, age 54-69.  -Cervical cancer screenings for women over age 72 are only recommended with certain risk factors. Here is a list of your current Health Maintenance items (your personalized list of preventive services) with a due date:  Health Maintenance Due   Topic Date Due    COVID-19 Vaccine (1) Never done    Shingles Vaccine (1 of 2) Never done    Bone Mineral Density   Never done    Pneumococcal Vaccine (1 of 1 - PPSV23) Never done    Yearly Flu Vaccine (1) Never done              Osteoporosis: Care Instructions  Overview     Osteoporosis causes bones to become thin and weak. It is much more common in women than in men. Your chances of getting this disease depend on several things. These factors include the thickness of your bones (bone density), as well as health, diet, and physical activity. This disease may be very advanced before you know you have it. Sometimes the first sign is a broken bone in the hip, spine, or wrist. Or you may have sudden pain in your middle or lower back. Follow-up care is a key part of your treatment and safety. Be sure to make and go to all appointments, and call your doctor if you are having problems. It's also a good idea to know your test results and keep a list of the medicines you take. How can you care for yourself at home? · Your doctor may prescribe a bisphosphonate, such as risedronate (Actonel) or alendronate (Fosamax), for osteoporosis. If you are taking one of these medicines by mouth:  ? Take your medicine with a full glass of water when you first get up in the morning. ? Do not lie down, eat, drink a beverage, or take any other medicine for at least 30 minutes after taking the drug. This helps prevent stomach problems. ? Do not take your medicine late in the day if you forgot to take it in the morning. Skip it, and take the usual dose the next morning. ? If you have side effects, tell your doctor. Your doctor may prescribe another medicine. · Get enough calcium and vitamin D. The recommended daily allowances (RDAs) for adults younger than age 46 are 1,000 mg of calcium and 600 IU of vitamin D each day. Women ages 46 to 79 need 1,200 mg of calcium and 600 IU of vitamin D each day. Men ages 46 to 79 need 1,000 mg of calcium and 600 IU of vitamin D each day. Adults 71 and older need 1,200 mg of calcium and 800 IU of vitamin D each day. It's not clear if people who already have osteoporosis need more calcium and vitamin D than this. Talk to your doctor about what's right for you.  ? Eat foods rich in calcium, like yogurt, cheese, milk, and dark green vegetables. This is a good way to get the calcium you need.  You can get vitamin D from eggs, fatty fish, cereal, and milk. ? Ask your doctor if you need to take a calcium plus vitamin D supplement. You may be able to get enough calcium and vitamin D through your diet. Be careful with supplements. Adults ages 23 to 48 should not get more than 2,500 mg of calcium and 4,000 IU of vitamin D each day, whether it is from supplements and/or food. Adults ages 46 and older should not get more than 2,000 mg of calcium and 4,000 IU of vitamin D each day from supplements and/or food. · Limit alcohol to 2 drinks a day for men and 1 drink a day for women. Too much alcohol can cause health problems. · Do not smoke. Smoking puts you at a much higher risk for osteoporosis. If you need help quitting, talk to your doctor about stop-smoking programs and medicines. These can increase your chances of quitting for good. · Get regular bone-building exercise. Weight-bearing and resistance exercises keep bones healthy by working the muscles and bones against gravity. Start out at an exercise level that feels right for you. Add a little at a time until you can do the following:  ? Do 30 minutes of weight-bearing exercise on most days of the week. Walking, jogging, stair climbing, and dancing are good choices. ? Do resistance exercises with weights or elastic bands 2 to 3 days a week. · Reduce your risk of falls:  ? Wear supportive shoes with low heels and nonslip soles. ? Use a cane or walker, if you need it. Use shower chairs and bath benches. Put in handrails on stairways, around your shower or tub area, and near the toilet. ? Keep stairs, porches, and walkways well lit. Use night-lights. ? Remove throw rugs and other objects that are in the way. ? Avoid icy, wet, or slippery surfaces. ? Keep a cordless phone and a flashlight with new batteries by your bed. When should you call for help? Watch closely for changes in your health, and be sure to contact your doctor if you have any problems. Where can you learn more?   Go to http://www.gray.com/  Enter K100 in the search box to learn more about \"Osteoporosis: Care Instructions. \"  Current as of: December 7, 2020               Content Version: 13.0  © 2006-2021 [employer]. Care instructions adapted under license by [employer] (which disclaims liability or warranty for this information). If you have questions about a medical condition or this instruction, always ask your healthcare professional. [address] any warranty or liability for your use of this information. Preventing Falls: Care Instructions  Your Care Instructions    Getting around your home safely can be a challenge if you have injuries or health problems that make it easy for you to fall. Loose rugs and furniture in walkways are among the dangers for many older people who have problems walking or who have poor eyesight. People who have conditions such as arthritis, osteoporosis, or dementia also have to be careful not to fall. You can make your home safer with a few simple measures. Follow-up care is a key part of your treatment and safety. Be sure to make and go to all appointments, and call your doctor if you are having problems. It's also a good idea to know your test results and keep a list of the medicines you take. How can you care for yourself at home? Taking care of yourself  · You may get dizzy if you do not drink enough water. To prevent dehydration, drink plenty of fluids, enough so that your urine is light yellow or clear like water. Choose water and other caffeine-free clear liquids. If you have kidney, heart, or liver disease and have to limit fluids, talk with your doctor before you increase the amount of fluids you drink. · Exercise regularly to improve your strength, muscle tone, and balance. Walk if you can. Swimming may be a good choice if you cannot walk easily.   · Have your vision and hearing checked each year or any time you notice a change. If you have trouble seeing and hearing, you might not be able to avoid objects and could lose your balance. · Know the side effects of the medicines you take. Ask your doctor or pharmacist whether the medicines you take can affect your balance. Sleeping pills or sedatives can affect your balance. · Limit the amount of alcohol you drink. Alcohol can impair your balance and other senses. · Ask your doctor whether calluses or corns on your feet need to be removed. If you wear loose-fitting shoes because of calluses or corns, you can lose your balance and fall. · Talk to your doctor if you have numbness in your feet. Preventing falls at home  · Remove raised doorway thresholds, throw rugs, and clutter. Repair loose carpet or raised areas in the floor. · Move furniture and electrical cords to keep them out of walking paths. · Use nonskid floor wax, and wipe up spills right away, especially on ceramic tile floors. · If you use a walker or cane, put rubber tips on it. If you use crutches, clean the bottoms of them regularly with an abrasive pad, such as steel wool. · Keep your house well lit, especially [place], and outside walkways. Use night-lights in areas such as hallways and bathrooms. Add extra light switches or use remote switches (such as switches that go on or off when you clap your hands) to make it easier to turn lights on if you have to get up during the night. · Install sturdy handrails on stairways. · Move items in your cabinets so that the things you use a lot are on the lower shelves (about waist level). · Keep a cordless phone and a flashlight with new batteries by your bed. If possible, put a phone in each of the main rooms of your house, or carry a cell phone in case you fall and cannot reach a phone. Or, you can wear a device around your neck or wrist. You push a button that sends a signal for help.   · Wear low-heeled shoes that fit well and give your feet good support. Use footwear with nonskid soles. Check the heels and soles of your shoes for wear. Repair or replace worn heels or soles. · Do not wear socks without shoes on wood floors. · Walk on the grass when the sidewalks are slippery. If you live in an area that gets snow and ice in the winter, sprinkle salt on slippery steps and sidewalks. Preventing falls in the bath  · Install grab bars and nonskid mats inside and outside your shower or tub and near the toilet and sinks. · Use shower chairs and bath benches. · Use a hand-held shower head that will allow you to sit while showering. · Get into a tub or shower by putting the weaker leg in first. Get out of a tub or shower with your strong side first.  · Repair loose toilet seats and consider installing a raised toilet seat to make getting on and off the toilet easier. · Keep your bathroom door unlocked while you are in the shower. Where can you learn more? Go to http://www.edgar.com/. Enter 0476 [id number] in the search box to learn more about \"Preventing Falls: Care Instructions. \"  Current as of: March 16, 2018  Content Version: 11.8  © 6875-2503 Healthwise, [employer]. Care instructions adapted under license by [employer] (which disclaims liability or warranty for this information). If you have questions about a medical condition or this instruction, always ask your healthcare professional. [place] [address] any warranty or liability for your use of this information. Preventing Falls: Care Instructions  Your Care Instructions    Getting around your home safely can be a challenge if you have injuries or health problems that make it easy for you to fall. Loose rugs and furniture in walkways are among the dangers for many older people who have problems walking or who have poor eyesight.  People who have conditions such as arthritis, osteoporosis, or dementia also have to be careful not to fall.  You can make your home safer with a few simple measures. Follow-up care is a key part of your treatment and safety. Be sure to make and go to all appointments, and call your doctor if you are having problems. It's also a good idea to know your test results and keep a list of the medicines you take. How can you care for yourself at home? Taking care of yourself  · You may get dizzy if you do not drink enough water. To prevent dehydration, drink plenty of fluids, enough so that your urine is light yellow or clear like water. Choose water and other caffeine-free clear liquids. If you have kidney, heart, or liver disease and have to limit fluids, talk with your doctor before you increase the amount of fluids you drink. · Exercise regularly to improve your strength, muscle tone, and balance. Walk if you can. Swimming may be a good choice if you cannot walk easily. · Have your vision and hearing checked each year or any time you notice a change. If you have trouble seeing and hearing, you might not be able to avoid objects and could lose your balance. · Know the side effects of the medicines you take. Ask your doctor or pharmacist whether the medicines you take can affect your balance. Sleeping pills or sedatives can affect your balance. · Limit the amount of alcohol you drink. Alcohol can impair your balance and other senses. · Ask your doctor whether calluses or corns on your feet need to be removed. If you wear loose-fitting shoes because of calluses or corns, you can lose your balance and fall. · Talk to your doctor if you have numbness in your feet. Preventing falls at home  · Remove raised doorway thresholds, throw rugs, and clutter. Repair loose carpet or raised areas in the floor. · Move furniture and electrical cords to keep them out of walking paths. · Use nonskid floor wax, and wipe up spills right away, especially on ceramic tile floors. · If you use a walker or cane, put rubber tips on it. If you use crutches, clean the bottoms of them regularly with an abrasive pad, such as steel wool. · Keep your house well lit, especially [place], and outside walkways. Use night-lights in areas such as hallways and bathrooms. Add extra light switches or use remote switches (such as switches that go on or off when you clap your hands) to make it easier to turn lights on if you have to get up during the night. · Install sturdy handrails on stairways. · Move items in your cabinets so that the things you use a lot are on the lower shelves (about waist level). · Keep a cordless phone and a flashlight with new batteries by your bed. If possible, put a phone in each of the main rooms of your house, or carry a cell phone in case you fall and cannot reach a phone. Or, you can wear a device around your neck or wrist. You push a button that sends a signal for help. · Wear low-heeled shoes that fit well and give your feet good support. Use footwear with nonskid soles. Check the heels and soles of your shoes for wear. Repair or replace worn heels or soles. · Do not wear socks without shoes on wood floors. · Walk on the grass when the sidewalks are slippery. If you live in an area that gets snow and ice in the winter, sprinkle salt on slippery steps and sidewalks. Preventing falls in the bath  · Install grab bars and nonskid mats inside and outside your shower or tub and near the toilet and sinks. · Use shower chairs and bath benches. · Use a hand-held shower head that will allow you to sit while showering. · Get into a tub or shower by putting the weaker leg in first. Get out of a tub or shower with your strong side first.  · Repair loose toilet seats and consider installing a raised toilet seat to make getting on and off the toilet easier. · Keep your bathroom door unlocked while you are in the shower. Where can you learn more? Go to http://www.gray.com/.   Enter [id number] [id number] in the search box to learn more about \"Preventing Falls: Care Instructions. \"  Current as of: March 16, 2018  Content Version: 11.8  © 3284-5741 Healthwise, [employer]. Care instructions adapted under license by [employer] (which disclaims liability or warranty for this information). If you have questions about a medical condition or this instruction, always ask your healthcare professional. [place] [address] any warranty or liability for your use of this information.

## 2021-12-14 NOTE — PROGRESS NOTES
Chief Complaint   Patient presents with    Establish Care    Hypertension     1. Have you been to the ER, urgent care clinic since your last visit? Hospitalized since your last visit? N/A    2. Have you seen or consulted any other health care providers outside of the [address] [place] [address] since your last visit? Include any pap smears or colon screening. N/A      General Health Questions   -During the past 4 weeks:   -how would you rate your health in general? Good   -how often have you been bothered by feeling dizzy when standing up? -how much have you been bothered by bodily pain? moderately   -Have you noticed any hearing difficulties? yes   -has your physical and emotional health limited your social activities with family or friends? yes    Emotional Health Questions   -Do you have a history of depression, anxiety, or emotional problems? no  -Over the past 2 weeks, have you felt down, depressed or hopeless? no  -Over the past 2 weeks, have you felt little interest or pleasure in doing things? no    Health Habits   Please describe your diet habits: eats healthy  Do you get 5 servings of fruits or vegetables daily? yes  Do you exercise regularly? yes    Activities of Daily Living and Functional Status   -Do you need help with eating, walking, dressing, bathing, toileting, the phone, transportation, shopping, preparing meals, housework, laundry, medications or managing money? no  -In the past four weeks, was someone available to help you if you needed and wanted help with anything? yes  -Are you confident are you that you can control and manage most of your health problems? yes  -Have you been given information to help you keep track of your medications? no  -How often do you have trouble taking your medications as prescribed? never    Fall Risk and Home Safety   Have you fallen 2 or more times in the past year? Yes 3x  Does your home have rugs in the hallways? yes, Do you have grab bars in the bathrooms? no, Does your home have handrails on the stairs? no, Do you have adequate lighting in your home? yes  Do you have smoke detectors and check them regularly?  yes  Do you have difficulties driving a car/vehicle? no  Do you always fasten your seat belt when you are in a car? no

## 2021-12-15 LAB
ANION GAP SERPL CALC-SCNC: 6 MMOL/L (ref 5–15)
BUN SERPL-MCNC: 30 MG/DL (ref 6–20)
BUN/CREAT SERPL: 27 (ref 12–20)
CALCIUM SERPL-MCNC: 10.2 MG/DL (ref 8.5–10.1)
CHLORIDE SERPL-SCNC: 104 MMOL/L (ref 97–108)
CHOLEST SERPL-MCNC: 255 MG/DL
CO2 SERPL-SCNC: 28 MMOL/L (ref 21–32)
CREAT SERPL-MCNC: 1.11 MG/DL (ref 0.55–1.02)
CREAT UR-MCNC: 66.5 MG/DL
EST. AVERAGE GLUCOSE BLD GHB EST-MCNC: 134 MG/DL
GLUCOSE SERPL-MCNC: 108 MG/DL (ref 65–100)
HBA1C MFR BLD: 6.3 % (ref 4–5.6)
HDLC SERPL-MCNC: 68 MG/DL
HDLC SERPL: 3.8 {RATIO} (ref 0–5)
LDLC SERPL CALC-MCNC: 172.6 MG/DL (ref 0–100)
MICROALBUMIN UR-MCNC: 2.59 MG/DL
MICROALBUMIN/CREAT UR-RTO: 39 MG/G (ref 0–30)
POTASSIUM SERPL-SCNC: 4.2 MMOL/L (ref 3.5–5.1)
SODIUM SERPL-SCNC: 138 MMOL/L (ref 136–145)
TRIGL SERPL-MCNC: 72 MG/DL (ref ?–150)
VLDLC SERPL CALC-MCNC: 14.4 MG/DL

## 2022-01-11 ENCOUNTER — TELEPHONE (OUTPATIENT)
Dept: FAMILY MEDICINE CLINIC | Age: 82
End: 2022-01-11

## 2022-01-11 DIAGNOSIS — N18.31 STAGE 3A CHRONIC KIDNEY DISEASE (HCC): Primary | ICD-10-CM

## 2022-01-11 RX ORDER — LOSARTAN POTASSIUM 100 MG/1
100 TABLET ORAL DAILY
Qty: 30 TABLET | Refills: 0 | Status: SHIPPED | OUTPATIENT
Start: 2022-01-11 | End: 2022-02-10

## 2022-01-11 NOTE — TELEPHONE ENCOUNTER
Was not able to get in touch with Mrs. Radha Gomes to confired appt Dr. Cinthya Pereira wanted me to schedule.  Contacted the son as well and left a VM to give us a call back to confirm appt

## 2022-01-11 NOTE — TELEPHONE ENCOUNTER
I called the patient to discuss abnormal lab results and plan. Elevated Cr (CKDS3a)  - Already on Losartan 50 mg daily. Will increase to Losartan 100 mg daily for renal benefits. Advised patient to monitor BP at home while on starting new dose. - Discussed increased side effects of orthostatic hypotension (patient is already at increase risk of falls and she takes precautions at home)  - Advice the patient to schedule a follow up appointment for BP check and repeat BMP. If hypotensive, can dc'd HCTZ and just continue on Losartan. Hyperlipidemia:  - Patient declined statin and stated she will continue to work on her lifestyle. - Discussed increased risks of strokes and MI. Hypercalcemia: Ca: 10.2  - Very mild, she takes Ca supplements and is also on HCTZ. - She is unsure about how much Ca she is taking. I advised her to decrease her Ca supplement intake and watch her supplements.

## 2022-01-11 NOTE — PROGRESS NOTES
BMP: B, Cr: 1.11, GFR: 47, Ca: 10.2  A1C: 6.3%  Lipid panel: TC: 255, T, HDL: 68, LDL: 172    I called the patient to discuss abnormal lab results and plan. See telephone encounter.

## 2022-03-18 PROBLEM — E78.5 HYPERLIPIDEMIA: Status: ACTIVE | Noted: 2017-03-29

## 2022-03-18 PROBLEM — I10 ESSENTIAL HYPERTENSION: Status: ACTIVE | Noted: 2017-03-29

## 2022-03-18 PROBLEM — Z91.81 AT HIGH RISK FOR FALLS: Status: ACTIVE | Noted: 2021-12-14

## 2022-03-19 PROBLEM — N18.31 STAGE 3A CHRONIC KIDNEY DISEASE (HCC): Status: ACTIVE | Noted: 2022-01-11

## 2022-03-19 PROBLEM — R27.0 ATAXIA: Status: ACTIVE | Noted: 2019-07-06

## 2022-03-19 PROBLEM — G45.9 TIA (TRANSIENT ISCHEMIC ATTACK): Status: ACTIVE | Noted: 2019-07-06

## 2022-03-20 PROBLEM — R42 DIZZINESS: Status: ACTIVE | Noted: 2019-07-06

## 2022-04-03 ENCOUNTER — APPOINTMENT (OUTPATIENT)
Dept: GENERAL RADIOLOGY | Age: 82
End: 2022-04-03
Attending: EMERGENCY MEDICINE
Payer: MEDICARE

## 2022-04-03 ENCOUNTER — HOSPITAL ENCOUNTER (EMERGENCY)
Age: 82
Discharge: HOME | End: 2022-04-03
Attending: EMERGENCY MEDICINE
Payer: MEDICARE

## 2022-04-03 ENCOUNTER — APPOINTMENT (OUTPATIENT)
Dept: CT IMAGING | Age: 82
End: 2022-04-03
Attending: EMERGENCY MEDICINE
Payer: MEDICARE

## 2022-04-03 VITALS
HEIGHT: 60 IN | BODY MASS INDEX: 27.29 KG/M2 | TEMPERATURE: 97.7 F | SYSTOLIC BLOOD PRESSURE: 177 MMHG | WEIGHT: 139 LBS | OXYGEN SATURATION: 97 % | DIASTOLIC BLOOD PRESSURE: 83 MMHG | HEART RATE: 73 BPM | RESPIRATION RATE: 16 BRPM

## 2022-04-03 DIAGNOSIS — N39.0 URINARY TRACT INFECTION WITHOUT HEMATURIA, SITE UNSPECIFIED: ICD-10-CM

## 2022-04-03 DIAGNOSIS — N13.30 HYDRONEPHROSIS OF RIGHT KIDNEY: ICD-10-CM

## 2022-04-03 DIAGNOSIS — K80.20 CALCULUS OF GALLBLADDER WITHOUT CHOLECYSTITIS WITHOUT OBSTRUCTION: ICD-10-CM

## 2022-04-03 DIAGNOSIS — K44.9 HIATAL HERNIA: ICD-10-CM

## 2022-04-03 DIAGNOSIS — N20.1 URETEROLITHIASIS: Primary | ICD-10-CM

## 2022-04-03 LAB
ALBUMIN SERPL-MCNC: 3.5 G/DL (ref 3.5–5)
ALBUMIN/GLOB SERPL: 0.9 {RATIO} (ref 1.1–2.2)
ALP SERPL-CCNC: 81 U/L (ref 45–117)
ALT SERPL-CCNC: 21 U/L (ref 12–78)
ANION GAP SERPL CALC-SCNC: 4 MMOL/L (ref 5–15)
APPEARANCE UR: CLEAR
AST SERPL-CCNC: 18 U/L (ref 15–37)
BACTERIA URNS QL MICRO: ABNORMAL /HPF
BASOPHILS # BLD: 0.1 K/UL (ref 0–0.1)
BASOPHILS NFR BLD: 1 % (ref 0–1)
BILIRUB SERPL-MCNC: 0.3 MG/DL (ref 0.2–1)
BILIRUB UR QL: NEGATIVE
BUN SERPL-MCNC: 28 MG/DL (ref 6–20)
BUN/CREAT SERPL: 23 (ref 12–20)
CALCIUM SERPL-MCNC: 9.5 MG/DL (ref 8.5–10.1)
CHLORIDE SERPL-SCNC: 108 MMOL/L (ref 97–108)
CO2 SERPL-SCNC: 28 MMOL/L (ref 21–32)
COLOR UR: ABNORMAL
COMMENT, HOLDF: NORMAL
CREAT SERPL-MCNC: 1.24 MG/DL (ref 0.55–1.02)
DIFFERENTIAL METHOD BLD: ABNORMAL
EOSINOPHIL # BLD: 0.4 K/UL (ref 0–0.4)
EOSINOPHIL NFR BLD: 5 % (ref 0–7)
EPITH CASTS URNS QL MICRO: ABNORMAL /LPF
ERYTHROCYTE [DISTWIDTH] IN BLOOD BY AUTOMATED COUNT: 13.3 % (ref 11.5–14.5)
GLOBULIN SER CALC-MCNC: 3.7 G/DL (ref 2–4)
GLUCOSE SERPL-MCNC: 126 MG/DL (ref 65–100)
GLUCOSE UR STRIP.AUTO-MCNC: NEGATIVE MG/DL
HCT VFR BLD AUTO: 42.2 % (ref 35–47)
HGB BLD-MCNC: 13.7 G/DL (ref 11.5–16)
HGB UR QL STRIP: NEGATIVE
HYALINE CASTS URNS QL MICRO: ABNORMAL /LPF (ref 0–2)
IMM GRANULOCYTES # BLD AUTO: 0 K/UL (ref 0–0.04)
IMM GRANULOCYTES NFR BLD AUTO: 0 % (ref 0–0.5)
KETONES UR QL STRIP.AUTO: NEGATIVE MG/DL
LEUKOCYTE ESTERASE UR QL STRIP.AUTO: ABNORMAL
LIPASE SERPL-CCNC: 393 U/L (ref 73–393)
LYMPHOCYTES # BLD: 1.8 K/UL (ref 0.8–3.5)
LYMPHOCYTES NFR BLD: 24 % (ref 12–49)
MCH RBC QN AUTO: 29.1 PG (ref 26–34)
MCHC RBC AUTO-ENTMCNC: 32.5 G/DL (ref 30–36.5)
MCV RBC AUTO: 89.8 FL (ref 80–99)
MONOCYTES # BLD: 0.6 K/UL (ref 0–1)
MONOCYTES NFR BLD: 9 % (ref 5–13)
NEUTS SEG # BLD: 4.6 K/UL (ref 1.8–8)
NEUTS SEG NFR BLD: 61 % (ref 32–75)
NITRITE UR QL STRIP.AUTO: NEGATIVE
NRBC # BLD: 0 K/UL (ref 0–0.01)
NRBC BLD-RTO: 0 PER 100 WBC
PH UR STRIP: 6.5 [PH] (ref 5–8)
PLATELET # BLD AUTO: 416 K/UL (ref 150–400)
PMV BLD AUTO: 8.9 FL (ref 8.9–12.9)
POTASSIUM SERPL-SCNC: 4.2 MMOL/L (ref 3.5–5.1)
PROT SERPL-MCNC: 7.2 G/DL (ref 6.4–8.2)
PROT UR STRIP-MCNC: NEGATIVE MG/DL
RBC # BLD AUTO: 4.7 M/UL (ref 3.8–5.2)
RBC #/AREA URNS HPF: ABNORMAL /HPF (ref 0–5)
SAMPLES BEING HELD,HOLD: NORMAL
SODIUM SERPL-SCNC: 140 MMOL/L (ref 136–145)
SP GR UR REFRACTOMETRY: 1.01 (ref 1–1.03)
TROPONIN-HIGH SENSITIVITY: 11 NG/L (ref 0–51)
UROBILINOGEN UR QL STRIP.AUTO: 0.2 EU/DL (ref 0.2–1)
WBC # BLD AUTO: 7.5 K/UL (ref 3.6–11)
WBC URNS QL MICRO: ABNORMAL /HPF (ref 0–4)

## 2022-04-03 PROCEDURE — 74177 CT ABD & PELVIS W/CONTRAST: CPT

## 2022-04-03 PROCEDURE — 93005 ELECTROCARDIOGRAM TRACING: CPT

## 2022-04-03 PROCEDURE — 74011250636 HC RX REV CODE- 250/636: Performed by: EMERGENCY MEDICINE

## 2022-04-03 PROCEDURE — 87086 URINE CULTURE/COLONY COUNT: CPT

## 2022-04-03 PROCEDURE — 71045 X-RAY EXAM CHEST 1 VIEW: CPT

## 2022-04-03 PROCEDURE — 83690 ASSAY OF LIPASE: CPT

## 2022-04-03 PROCEDURE — 96374 THER/PROPH/DIAG INJ IV PUSH: CPT

## 2022-04-03 PROCEDURE — 36415 COLL VENOUS BLD VENIPUNCTURE: CPT

## 2022-04-03 PROCEDURE — 74011000636 HC RX REV CODE- 636: Performed by: EMERGENCY MEDICINE

## 2022-04-03 PROCEDURE — 87077 CULTURE AEROBIC IDENTIFY: CPT

## 2022-04-03 PROCEDURE — 72131 CT LUMBAR SPINE W/O DYE: CPT

## 2022-04-03 PROCEDURE — 80053 COMPREHEN METABOLIC PANEL: CPT

## 2022-04-03 PROCEDURE — 85025 COMPLETE CBC W/AUTO DIFF WBC: CPT

## 2022-04-03 PROCEDURE — 99285 EMERGENCY DEPT VISIT HI MDM: CPT

## 2022-04-03 PROCEDURE — 84484 ASSAY OF TROPONIN QUANT: CPT

## 2022-04-03 PROCEDURE — 74011000250 HC RX REV CODE- 250: Performed by: EMERGENCY MEDICINE

## 2022-04-03 PROCEDURE — 81001 URINALYSIS AUTO W/SCOPE: CPT

## 2022-04-03 PROCEDURE — 74011250637 HC RX REV CODE- 250/637: Performed by: EMERGENCY MEDICINE

## 2022-04-03 PROCEDURE — 87186 SC STD MICRODIL/AGAR DIL: CPT

## 2022-04-03 PROCEDURE — 87040 BLOOD CULTURE FOR BACTERIA: CPT

## 2022-04-03 RX ORDER — ACETAMINOPHEN 325 MG/1
650 TABLET ORAL
Status: DISCONTINUED | OUTPATIENT
Start: 2022-04-03 | End: 2022-04-03 | Stop reason: HOSPADM

## 2022-04-03 RX ORDER — CEFUROXIME AXETIL 500 MG/1
500 TABLET ORAL 2 TIMES DAILY
Qty: 14 TABLET | Refills: 0 | Status: SHIPPED | OUTPATIENT
Start: 2022-04-03 | End: 2022-04-10

## 2022-04-03 RX ORDER — IBUPROFEN 400 MG/1
400 TABLET ORAL
Status: DISCONTINUED | OUTPATIENT
Start: 2022-04-03 | End: 2022-04-03 | Stop reason: HOSPADM

## 2022-04-03 RX ORDER — LIDOCAINE 4 G/100G
1 PATCH TOPICAL EVERY 24 HOURS
Status: DISCONTINUED | OUTPATIENT
Start: 2022-04-03 | End: 2022-04-03 | Stop reason: HOSPADM

## 2022-04-03 RX ADMIN — IOPAMIDOL 100 ML: 755 INJECTION, SOLUTION INTRAVENOUS at 09:06

## 2022-04-03 RX ADMIN — IBUPROFEN 400 MG: 400 TABLET, FILM COATED ORAL at 08:10

## 2022-04-03 RX ADMIN — SODIUM CHLORIDE 1 G: 9 INJECTION INTRAMUSCULAR; INTRAVENOUS; SUBCUTANEOUS at 09:50

## 2022-04-03 NOTE — ED TRIAGE NOTES
Pt to ED for c/o bilateral lower back pain radiating down leg since Wednesday. Denies injury or trauma. Took tylenol with no relief. Reports one episode of emesis last night related to pain.  Denies urinary sx, fever or chills

## 2022-04-03 NOTE — ED PROVIDER NOTES
Patient is here because of back pain and bilateral flank pain. She states that for the past several months she is having pain in the lower back area that radiates down bilateral legs causing paresthesias to her bilateral feet and soles of her feet. She states that approximately 5 days ago she was working in her garden lifting 40 pounds. Since then she has had irritation of the lower back with worsening pain. She does state that wraps around her bilateral flanks. Had one episode of vomiting last night due to the pain. Denies any fever chills weight loss. No chest pain shortness of breath. No rash. No vomiting today. No diarrhea constipation dysuria urgency frequency. History of bilateral tubal ligation as well as appendectomy. States that this time she is feeling \"okay\" and does not want any pain medicine. Denies any bowel or bladder incontinence. Denies history of AAA. Denies heart or lung problems. She does state that the pain in the bilateral flanks is more in the lower rib area bilaterally. She states that that is new since Tuesday when the gardening occurred. Past Medical History:   Diagnosis Date    High triglycerides     Hypertension        Past Surgical History:   Procedure Laterality Date    HX APPENDECTOMY      HX KNEE REPLACEMENT Bilateral     [id number]    HX TUBAL LIGATION           Family History:   Problem Relation Age of Onset    Cancer Sister         cervical    COPD Brother     Hypertension Mother     Hypertension Father     Emphysema Father        Social History     Socioeconomic History    Marital status:      Spouse name: Not on file    Number of children: Not on file    Years of education: Not on file    Highest education level: Not on file   Occupational History    Not on file   Tobacco Use    Smoking status: Never Smoker    Smokeless tobacco: Never Used   Substance and Sexual Activity    Alcohol use:  Yes    Drug use: No    Sexual activity: Not Currently   Other Topics Concern    Not on file   Social History Narrative    Not on file     Social Determinants of Health     Financial Resource Strain:     Difficulty of Paying Living Expenses: Not on file   Food Insecurity:     Worried About Running Out of Food in the Last Year: Not on file    Courtney of Food in the Last Year: Not on file   Transportation Needs:     Lack of Transportation (Medical): Not on file    Lack of Transportation (Non-Medical): Not on file   Physical Activity:     Days of Exercise per Week: Not on file    Minutes of Exercise per Session: Not on file   Stress:     Feeling of Stress : Not on file   Social Connections:     Frequency of Communication with Friends and Family: Not on file    Frequency of Social Gatherings with Friends and Family: Not on file    Attends Catholic Services: Not on file    Active Member of [address] [place] [address] or Organizations: Not on file    Attends Club or Organization Meetings: Not on file    Marital Status: Not on file   Intimate Partner Violence:     Fear of Current or Ex-Partner: Not on file    Emotionally Abused: Not on file    Physically Abused: Not on file    Sexually Abused: Not on file   Housing Stability:     Unable to Pay for Housing in the Last Year: Not on file    Number of [place] in the Last Year: Not on file    Unstable Housing in the Last Year: Not on file         ALLERGIES: Amlodipine, Ampicillin, and Lisinopril    Review of Systems   Constitutional: Negative for chills, diaphoresis, fatigue and fever. HENT: Negative for rhinorrhea and sore throat. Eyes: Negative for photophobia and pain. Respiratory: Negative for cough, shortness of breath and wheezing. Cardiovascular: Negative for chest pain, palpitations and leg swelling. Gastrointestinal: Negative for abdominal pain, constipation, diarrhea, nausea and vomiting. Genitourinary: Positive for flank pain. Negative for dysuria, hematuria and urgency. Musculoskeletal: Positive for back pain. Negative for arthralgias, gait problem, joint swelling, myalgias, neck pain and neck stiffness. Skin: Negative for color change, pallor, rash and wound. Neurological: Negative for dizziness, tremors, light-headedness and headaches. Vitals:    04/03/22 0709 04/03/22 0815 04/03/22 0819   BP: (!) 177/83     Pulse: 73     Resp: 16     Temp: 97.7 °F (36.5 °C)     SpO2: 99% 97%    Weight: 57.2 kg (126 lb)  63 kg (139 lb)   Height:   5' (1.524 m)            Physical Exam  Vitals and nursing note reviewed. Exam conducted with a chaperone present. Constitutional:       General: She is not in acute distress. Appearance: Normal appearance. She is normal weight. She is not ill-appearing, toxic-appearing or diaphoretic. HENT:      Head: Normocephalic and atraumatic. Right Ear: External ear normal.      Left Ear: External ear normal.      Nose: Nose normal. No congestion or rhinorrhea. Mouth/Throat:      Mouth: Mucous membranes are moist.      Pharynx: Oropharynx is clear. No oropharyngeal exudate or posterior oropharyngeal erythema. Eyes:      Extraocular Movements: Extraocular movements intact. Conjunctiva/sclera: Conjunctivae normal.      Pupils: Pupils are equal, round, and reactive to light. Cardiovascular:      Rate and Rhythm: Normal rate and regular rhythm. Pulses: Normal pulses. Heart sounds: Normal heart sounds. Pulmonary:      Effort: Pulmonary effort is normal.      Breath sounds: Normal breath sounds. Abdominal:      General: Abdomen is flat. Bowel sounds are normal. There is no distension. Palpations: Abdomen is soft. There is no mass. Tenderness: There is no guarding or rebound. Hernia: No hernia is present. Comments: Mild bilateral tenderness in the bilateral upper flank area. No edema erythema crepitus warmth fluctuance trauma rash.    Musculoskeletal:         General: No swelling, tenderness, deformity or signs of injury. Normal range of motion. Cervical back: Normal range of motion and neck supple. Right lower leg: No edema. Left lower leg: No edema. Comments: No midline tenderness palpation of the thoracic or lumbar spine. There is no edema erythema crepitus warmth fluctuance trauma rash. No step-off. Mild hypertonicity of the bilateral lumbar paraspinal muscles. No tenderness with palpation. Negative straight leg raise test bilaterally. Sensation to light touch intact in the L to L3-L4 L5-S1 dermatomes bilaterally. 2+ and equal patellar reflexes bilaterally. No inducible ankle clonus. 2+ and equal dorsalis pedis and posterior tibialis pulses bilaterally. Skin of the bilateral lower extremities is warm and pink and equal in color and temperature bilaterally. Skin:     General: Skin is warm and dry. Capillary Refill: Capillary refill takes less than 2 seconds. Neurological:      General: No focal deficit present. Mental Status: She is alert and oriented to person, place, and time. Mental status is at baseline. Psychiatric:         Mood and Affect: Mood normal.         Behavior: Behavior normal.          [facility]  ED Course as of 04/03/22 1018   Sun Apr 03, 2022   7165 Patient examined. With hypertension otherwise vital signs normal.  Patient no acute distress. Pain in the bilateral lumbar area of her back going into bilateral flanks. Patient is in no acute distress. No focal neurologic deficits. Concern for musculoskeletal injury but given her age and the pain going into the bilateral rib area and the flank pain concern for possible intra-abdominal process. Will get CT scans. Denies any chest pain but did have an episode of vomiting she says due to the pain. Will get EKG troponin to evaluate ACS.   No obvious signs of cauda equina on exam.  No obvious infection [AF]   0801 EKG with normal sinus rhythm rate 71 normal CO interval normal QRS normal QTC, normal ST segments. [AF]   0801 XR CHEST PORTABLE [AF]   0837 CBC WITH AUTOMATED DIFF(!):    WBC 7.5   RBC 4.70   HGB 13.7   HCT 42.2   MCV 89.8   MCH 29.1   MCHC 32.5   RDW 13.3   PLATELET 229(!)   MPV 8.9   NRBC 0.0   ABSOLUTE NRBC 0.00   NEUTROPHILS 61   LYMPHOCYTES 24   MONOCYTES 9   EOSINOPHILS 5   BASOPHILS 1   IMMATURE GRANULOCYTES 0   ABS. NEUTROPHILS 4.6   ABS. LYMPHOCYTES 1.8   ABS. MONOCYTES 0.6   ABS. EOSINOPHILS 0.4   ABS. BASOPHILS 0.1   ABS. IMM. GRANS. 0.0   DF AUTOMATED [AF]   [id number] COMPREHENSIVE METABOLIC PANEL(!):    Sodium 140   Potassium 4.2   Chloride 108   CO2 28   Anion gap 4(!)   Glucose 126(!)   BUN 28(!)   Creatinine 1.24(!)   BUN/Creatinine ratio 23(!)   GFR est AA 50(!)   GFR est non-AA 41(!)   Calcium 9.5   Bilirubin, total 0.3   ALT 21   AST 18   Alk. phosphatase 81   Protein, total 7.2   Albumin 3.5   Globulin 3.7   A-G Ratio 0.9(!) [AF]   0908 URINALYSIS W/ RFLX MICROSCOPIC(!):    Color YELLOW/STRAW   Appearance CLEAR   Specific gravity 1.013   pH (UA) 6.5   Protein Negative   Glucose Negative   Ketone Negative   Bilirubin Negative   Blood Negative   Urobilinogen 0.2   Nitrites Negative   Leukocyte Esterase LARGE(!) [AF]   0908 TROPONIN-HIGH SENSITIVITY:    Troponin-High Sensitivity 11 [AF]   0928  7 mm stone in the proximal right ureter with moderate right-sided  Hydronephrosis. Given UA findings, will get cultures and start ABX. Will talk to urology  [AF]   1881 Patient rechecked. Patient no acute distress. Will talk to urology. Patient is hesitant to stay if admission is recommended due to financial reasons. Had in-depth conversation the patient needs to stay for admission that is because of risk of deterioration and need for further care of her kidney stone and possible UTI [AF]   0935 CT SPINE LUMB WO CONT [AF]   0936 CT ABD PELV W CONT [AF]   1015 Spoke with urology. They say that they are concerned about the white blood cells in the urine. I am as well. The patient I would recommend admission but she declined saying that she does not want to stay. I explained to her the risks of going home which include death, permanent disability, worsening infection, kidney injury and kidney failure. She verbalized understanding but states that she must go home and she does not want to stay. Explained to patient that she must follow-up with urology tomorrow. I explained to patient that she must return to the ED immediately if she has any new or worsening symptoms or changes her mind for admission. Patient verbalized understanding and agreement with plan. Will discharge with antibiotics. Strict return to ED instructions provided.  [AF]      ED Course User Index  [AF] Terry Camilo,        Procedures

## 2022-04-05 LAB
ATRIAL RATE: 71 BPM
BACTERIA SPEC CULT: ABNORMAL
BACTERIA SPEC CULT: ABNORMAL
CALCULATED P AXIS, ECG09: 18 DEGREES
CALCULATED R AXIS, ECG10: 17 DEGREES
CALCULATED T AXIS, ECG11: 64 DEGREES
CC UR VC: ABNORMAL
DIAGNOSIS, 93000: NORMAL
P-R INTERVAL, ECG05: 148 MS
Q-T INTERVAL, ECG07: 408 MS
QRS DURATION, ECG06: 74 MS
QTC CALCULATION (BEZET), ECG08: 443 MS
SERVICE CMNT-IMP: ABNORMAL
VENTRICULAR RATE, ECG03: 71 BPM

## 2022-04-09 LAB
BACTERIA SPEC CULT: NORMAL
SERVICE CMNT-IMP: NORMAL

## 2022-04-15 ENCOUNTER — TELEPHONE (OUTPATIENT)
Dept: FAMILY MEDICINE CLINIC | Age: 82
End: 2022-04-15

## 2022-04-15 NOTE — TELEPHONE ENCOUNTER
Patient came into the office today wondering if she was able to get a referral for a neurologist. I will set her up for an appointment to be seen first.

## 2022-04-19 ENCOUNTER — TELEPHONE (OUTPATIENT)
Dept: FAMILY MEDICINE CLINIC | Age: 82
End: 2022-04-19

## 2022-04-19 NOTE — TELEPHONE ENCOUNTER
----- Message from Cirilo Hernandez sent at 4/18/2022 11:29 AM EDT -----  Subject: Message to Provider    QUESTIONS  Information for Provider? Pt just wants a few names of neurologist she can   go to, she doesn't need a referral with her insurance. Pt would like a   call back ASAP.   ---------------------------------------------------------------------------  --------------  CALL BACK INFO  What is the best way for the office to contact you? OK to leave message on   voicemail  Preferred Call Back Phone Number? [phone]  ---------------------------------------------------------------------------  --------------  SCRIPT ANSWERS  Relationship to Patient?  Self

## 2022-04-19 NOTE — TELEPHONE ENCOUNTER
Patient was scheduled for an appt for tomorrow to discuss neurology referral.  This will be discussed at [address] visit.

## 2022-07-11 ENCOUNTER — TELEPHONE (OUTPATIENT)
Dept: FAMILY MEDICINE CLINIC | Age: 82
End: 2022-07-11

## 2022-07-11 NOTE — TELEPHONE ENCOUNTER
Pt will be on vacation and has until 7/23/22 of her medication left.  Pt would like an early refill if possible to hold her through till she comes back from her vacation around in August.    Pres Drug:  HYDROCLOROTHIAZIDE TAB 25MG

## 2022-07-13 DIAGNOSIS — I10 HYPERTENSION, UNSPECIFIED TYPE: Primary | ICD-10-CM

## 2022-07-13 RX ORDER — HYDROCHLOROTHIAZIDE 25 MG/1
25 TABLET ORAL DAILY
Qty: 30 TABLET | Refills: 2 | Status: SHIPPED | OUTPATIENT
Start: 2022-07-13 | End: 2022-08-12

## 2022-07-19 ENCOUNTER — OFFICE VISIT (OUTPATIENT)
Dept: FAMILY MEDICINE CLINIC | Age: 82
End: 2022-07-19
Payer: MEDICARE

## 2022-07-19 VITALS
DIASTOLIC BLOOD PRESSURE: 62 MMHG | OXYGEN SATURATION: 97 % | RESPIRATION RATE: 17 BRPM | SYSTOLIC BLOOD PRESSURE: 151 MMHG | WEIGHT: 126 LBS | BODY MASS INDEX: 24.74 KG/M2 | TEMPERATURE: 97.5 F | HEART RATE: 67 BPM | HEIGHT: 60 IN

## 2022-07-19 DIAGNOSIS — L24.89 IRRITANT CONTACT DERMATITIS DUE TO OTHER AGENTS: Primary | ICD-10-CM

## 2022-07-19 PROCEDURE — G8754 DIAS BP LESS 90: HCPCS | Performed by: STUDENT IN AN ORGANIZED HEALTH CARE EDUCATION/TRAINING PROGRAM

## 2022-07-19 PROCEDURE — G8536 NO DOC ELDER MAL SCRN: HCPCS | Performed by: STUDENT IN AN ORGANIZED HEALTH CARE EDUCATION/TRAINING PROGRAM

## 2022-07-19 PROCEDURE — G8753 SYS BP > OR = 140: HCPCS | Performed by: STUDENT IN AN ORGANIZED HEALTH CARE EDUCATION/TRAINING PROGRAM

## 2022-07-19 PROCEDURE — G8427 DOCREV CUR MEDS BY ELIG CLIN: HCPCS | Performed by: STUDENT IN AN ORGANIZED HEALTH CARE EDUCATION/TRAINING PROGRAM

## 2022-07-19 PROCEDURE — 99213 OFFICE O/P EST LOW 20 MIN: CPT | Performed by: STUDENT IN AN ORGANIZED HEALTH CARE EDUCATION/TRAINING PROGRAM

## 2022-07-19 PROCEDURE — G8432 DEP SCR NOT DOC, RNG: HCPCS | Performed by: STUDENT IN AN ORGANIZED HEALTH CARE EDUCATION/TRAINING PROGRAM

## 2022-07-19 PROCEDURE — 1123F ACP DISCUSS/DSCN MKR DOCD: CPT | Performed by: STUDENT IN AN ORGANIZED HEALTH CARE EDUCATION/TRAINING PROGRAM

## 2022-07-19 PROCEDURE — G8400 PT W/DXA NO RESULTS DOC: HCPCS | Performed by: STUDENT IN AN ORGANIZED HEALTH CARE EDUCATION/TRAINING PROGRAM

## 2022-07-19 PROCEDURE — G8420 CALC BMI NORM PARAMETERS: HCPCS | Performed by: STUDENT IN AN ORGANIZED HEALTH CARE EDUCATION/TRAINING PROGRAM

## 2022-07-19 PROCEDURE — 1101F PT FALLS ASSESS-DOCD LE1/YR: CPT | Performed by: STUDENT IN AN ORGANIZED HEALTH CARE EDUCATION/TRAINING PROGRAM

## 2022-07-19 PROCEDURE — 1090F PRES/ABSN URINE INCON ASSESS: CPT | Performed by: STUDENT IN AN ORGANIZED HEALTH CARE EDUCATION/TRAINING PROGRAM

## 2022-07-19 RX ORDER — HYDROCORTISONE 1 %
CREAM (GRAM) TOPICAL 2 TIMES DAILY
Qty: 30 G | Refills: 0 | Status: SHIPPED | OUTPATIENT
Start: 2022-07-19

## 2022-07-19 RX ORDER — DIPHENHYDRAMINE HCL 25 MG
25 CAPSULE ORAL
COMMUNITY

## 2022-07-19 RX ORDER — LOSARTAN POTASSIUM 50 MG/1
TABLET ORAL DAILY
COMMUNITY
End: 2022-11-04 | Stop reason: SDUPTHER

## 2022-07-19 NOTE — PATIENT INSTRUCTIONS
Hydrocortisone (By injection)   Hydrocortisone (aib-jtfc-EBU-ti-sone)  Treats inflammation and many other medical problems. This medicine is a corticosteroid. Brand Name(s): A-Hydrocort, Solu-CORTEF, Solu-CORTEF 1000mg Vial, Solu-CORTEF 100mg Vial, Solu-CORTEF 250mg Vial, Solu-CORTEF 500mg Vial   There may be other brand names for this medicine. When This Medicine Should Not Be Used: This medicine is not right for everyone. You should not receive it if you had an allergic reaction to hydrocortisone or if you have a fungus infection. How to Use This Medicine:   Injectable  · Your doctor will prescribe your exact dose and tell you how often it should be given. This medicine is given as a shot into a muscle or into a vein. · A nurse or other health provider will give you this medicine. · Missed dose: Call your doctor or pharmacist for instructions. Drugs and Foods to Avoid:   Ask your doctor or pharmacist before using any other medicine, including over-the-counter medicines, vitamins, and herbal products. · Some medicines can affect how hydrocortisone works. Tell you doctor if you are also using any of the following:  ¨ Aminoglutethimide, amphotericin B, carbamazepine, cholestyramine, cyclosporine, digitalis, isoniazid, ketoconazole, or phenytoin  ¨ An antibiotic (including azithromycin, clarithromycin, erythromycin, rifampin, troleandomycin)  ¨ Blood thinner (including warfarin)  ¨ A diuretic (water pill)  ¨ Estrogen, including oral contraceptive  ¨ Medicine for diabetes (including insulin)  ¨ An NSAID (including aspirin, celecoxib, diclofenac, ibuprofen, naproxen)  · This medicine may interfere with vaccines. Ask your doctor before you get a flu shot or any other vaccines.   Warnings While Using This Medicine:   · Tell your doctor if you are pregnant or breastfeeding, of if you have heart problems, high blood pressure, diabetes, kidney disease, liver disease (including cirrhosis), osteoporosis, thyroid problems, stomach or bowel problems (including ulcer, colitis, or diverticulitis), mental health problems (including depression or psychosis), pheochromocytoma (adrenal tumor), tuberculosis, or myasthenia gravis. · This medicine may cause the following problems:  ¨ Adrenal gland problems  ¨ Cataracts or glaucoma  ¨ High blood pressure  ¨ Hyperglycemia (high blood sugar levels)  ¨ Increased risk for bone fractures and slow growth in children  · This medicine may cause you to get infections more easily. Tell your doctor if you have any type of infection before you start treatment. Avoid people who are sick or have infections. If you are exposed to chickenpox or measles, tell your doctor right away. · Tell any doctor or dentist who treats you that you are using this medicine. This medicine may affect certain medical test results. · Your doctor will do lab tests at regular visits to check on the effects of this medicine. Keep all appointments. Possible Side Effects While Using This Medicine:   Call your doctor right away if you notice any of these side effects:  · Allergic reaction: Itching or hives, swelling in your face or hands, swelling or tingling in your mouth or throat, chest tightness, trouble breathing  · Depression, mood swings, trouble sleeping, unusual thoughts, feelings, or behavior  · Dry mouth, increased thirst, muscle cramps, nausea or vomiting, uneven heartbeat  · Eye pain, vision changes  · Fever, chills, cough, sore throat, body aches  · Swelling in your hands, ankles, or feet  If you notice these less serious side effects, talk with your doctor:   · Changes in menstrual periods  · Headache  · Muscle pain or weakness  · Pain, itching, burning, swelling, or a lump under your skin where the shot was given  · Weight gain around your neck, upper back, breast, face or waist  If you notice other side effects that you think are caused by this medicine, tell your doctor.    Call your doctor for medical advice about side effects. You may report side effects to FDA at 5-239-JWS-9008  © 2017 [facility] Information is for End User's use only and may not be sold, redistributed or otherwise used for commercial purposes. The above information is an  only. It is not intended as medical advice for individual conditions or treatments. Talk to your doctor, nurse or pharmacist before following any medical regimen to see if it is safe and effective for you.

## 2022-07-19 NOTE — PROGRESS NOTES
Chief Complaint   Patient presents with    Rash     bilateral legs and arms times one week     1. Have you been to the ER, urgent care clinic since your last visit? Hospitalized since your last visit? Yes. [facility]. 2. Have you seen or consulted any other health care providers outside of the [address] [place] [address] since your last visit? Include any pap smears or colon screening.  No

## 2022-07-19 NOTE — PROGRESS NOTES
Paola Smith is an 78 y.o. female who presents for evaluation of erythematous, raised rash. It started 6 days ago. Rash started being pruritic, it has now non-pruritic. It is no longer stinging or burning. She took some Benadryl last night which improved her pruritic symptoms. She has never had a similar rash before. She is sure she was not exposed to poison ivy, but she was removing radha in her backyard and wrapping them around her arms while doing so. 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 Denies mosquito, insect, snakes or tick bites than she can recall. Denies any new medications. Rash is about the same, but it's becoming more dry. Denies any breathing problems or respiratory distress. Denies cough or  chest pain. Rash is not present in the areas she was not in contact with the radha like her back, face, neck or abdomen. Past Medical History - reviewed:  Past Medical History:   Diagnosis Date    High triglycerides     Hypertension        Medications - reviewed:   Current Outpatient Medications   Medication Sig    diphenhydrAMINE (BenadryL) 25 mg capsule Take 25 mg by mouth every six (6) hours as needed.  losartan (COZAAR) 50 mg tablet Take  by mouth daily.  hydrocortisone (CORTAID) 1 % topical cream Apply  to affected area two (2) times a day. use thin layer    hydroCHLOROthiazide (HYDRODIURIL) 25 mg tablet Take 1 Tablet by mouth daily for 30 days.  b complex vitamins tablet Take 1 Tab by mouth daily. No current facility-administered medications for this visit.          Past Surgical History - reviewed:   Past Surgical History:   Procedure Laterality Date    HX APPENDECTOMY      HX KNEE REPLACEMENT Bilateral     [id number]    HX TUBAL LIGATION           Social History - reviewed:  Social History     Socioeconomic History    Marital status:      Spouse name: Not on file    Number of children: Not on file    Years of education: Not on file    Highest education level: Not on file   Occupational History    Not on file   Tobacco Use    Smoking status: Never Smoker    Smokeless tobacco: Never Used   Substance and Sexual Activity    Alcohol use: Yes    Drug use: No    Sexual activity: Not Currently   Other Topics Concern    Not on file   Social History Narrative    Not on file     Social Determinants of Health     Financial Resource Strain:     Difficulty of Paying Living Expenses: Not on file   Food Insecurity:     Worried About Running Out of Food in the Last Year: Not on file    Courtney of Food in the Last Year: Not on file   Transportation Needs:     Lack of Transportation (Medical): Not on file    Lack of Transportation (Non-Medical): Not on file   Physical Activity:     Days of Exercise per Week: Not on file    Minutes of Exercise per Session: Not on file   Stress:     Feeling of Stress : Not on file   Social Connections:     Frequency of Communication with Friends and Family: Not on file    Frequency of Social Gatherings with Friends and Family: Not on file    Attends [employer] Services: Not on file    Active Member of Clubs or Organizations: Not on file    Attends Club or Organization Meetings: Not on file    Marital Status: Not on file   Intimate Partner Violence:     Fear of Current or Ex-Partner: Not on file    Emotionally Abused: Not on file    Physically Abused: Not on file    Sexually Abused: Not on file   Housing Stability:     Unable to Pay for Housing in the Last Year: Not on file    Number of [place] in the Last Year: Not on file    Unstable Housing in the Last Year: Not on file         Family History - reviewed:  Family History   Problem Relation Age of Onset    Cancer Sister         cervical    COPD Brother     Hypertension Mother     Hypertension Father     Emphysema Father        Allergies - reviewed:    Allergies   Allergen Reactions    Amlodipine Other (comments)     Caused her teeth to rot    Ampicillin Contact Dermatitis    Lisinopril Unknown (comments)     Mind confusion               Immunizations - reviewed:   Immunization History   Administered Date(s) Administered    COVID-19, PFIZER PURPLE top, DILUTE for use, (age 15 y+), IM, 30mcg/0.3mL 05/12/2021, 06/02/2021, 12/02/2021, 05/19/2022    Rabies Immune Globulin 02/16/2018    Rabies- IM Fibroblast Culture 02/16/2018, 02/19/2018, 02/23/2018, 03/02/2018    Tdap 02/15/2018         Physical Exam  Visit Vitals  BP (!) 151/62   Pulse 67   Temp 97.5 °F (36.4 °C) (Temporal)   Resp 17   Ht 5' (1.524 m)   Wt 126 lb (57.2 kg)   SpO2 97%   BMI 24.61 kg/m²       Physical Exam  Constitutional:       General: She is not in acute distress. Appearance: She is normal weight. She is not ill-appearing. Cardiovascular:      Rate and Rhythm: Normal rate and regular rhythm. Heart sounds: Normal heart sounds. Pulmonary:      Breath sounds: Normal breath sounds. Skin:     Findings: Erythema and rash present. No abrasion. Assessment/Plan    78year old female patient presents to clinic for evaluation of erythematous rash. ICD-10-CM ICD-9-CM    1. Irritant contact dermatitis due to other agents  L24.89 692.89 hydrocortisone (CORTAID) 1 % topical cream     - Avoid contact with radha. - Use over the counter Calamine for pruritus. Avoid Benadryl given side effects (Beer's list)  - Wear long sleeve shirts and pants while working outdoor.  - Call clinic if no improvement in 10 days. Will increase intensity of topical steroids. Patient states she would like to be refer to Dermatology if her rash doesn't improve in 2 weeks. Follow-up and Dispositions    · Return in about 5 months (around 12/15/2022) for medicare wellness. I have discussed the diagnosis with the patient and the intended plan as seen in the above orders. Patient verbalized understanding of the plan and agrees with the plan.  The patient has received an after-visit summary and questions were answered concerning future plans. I have discussed medication side effects and warnings with the patient as well. Informed patient to return to the office if new symptoms arise.     Patient discussed with Dr. Olinda Jacinto (attending physician)       Yoko Roque MD  Family Medicine Resident

## 2022-10-10 ENCOUNTER — APPOINTMENT (OUTPATIENT)
Dept: CT IMAGING | Age: 82
End: 2022-10-10
Attending: EMERGENCY MEDICINE
Payer: MEDICARE

## 2022-10-10 ENCOUNTER — HOSPITAL ENCOUNTER (EMERGENCY)
Age: 82
Discharge: HOME | End: 2022-10-10
Attending: EMERGENCY MEDICINE
Payer: MEDICARE

## 2022-10-10 VITALS
SYSTOLIC BLOOD PRESSURE: 171 MMHG | TEMPERATURE: 98 F | BODY MASS INDEX: 24.41 KG/M2 | HEART RATE: 73 BPM | DIASTOLIC BLOOD PRESSURE: 80 MMHG | OXYGEN SATURATION: 99 % | RESPIRATION RATE: 16 BRPM | WEIGHT: 125 LBS

## 2022-10-10 DIAGNOSIS — I10 HYPERTENSION, UNSPECIFIED TYPE: ICD-10-CM

## 2022-10-10 DIAGNOSIS — R51.9 ACUTE NONINTRACTABLE HEADACHE, UNSPECIFIED HEADACHE TYPE: Primary | ICD-10-CM

## 2022-10-10 LAB
ALBUMIN SERPL-MCNC: 3.5 G/DL (ref 3.5–5)
ALBUMIN/GLOB SERPL: 0.9 {RATIO} (ref 1.1–2.2)
ALP SERPL-CCNC: 88 U/L (ref 45–117)
ALT SERPL-CCNC: 20 U/L (ref 12–78)
ANION GAP SERPL CALC-SCNC: 3 MMOL/L (ref 5–15)
AST SERPL-CCNC: 20 U/L (ref 15–37)
BASOPHILS # BLD: 0.1 K/UL (ref 0–0.1)
BASOPHILS NFR BLD: 1 % (ref 0–1)
BILIRUB SERPL-MCNC: 0.3 MG/DL (ref 0.2–1)
BUN SERPL-MCNC: 27 MG/DL (ref 6–20)
BUN/CREAT SERPL: 23 (ref 12–20)
CALCIUM SERPL-MCNC: 9.9 MG/DL (ref 8.5–10.1)
CHLORIDE SERPL-SCNC: 106 MMOL/L (ref 97–108)
CO2 SERPL-SCNC: 32 MMOL/L (ref 21–32)
COMMENT, HOLDF: NORMAL
CREAT SERPL-MCNC: 1.17 MG/DL (ref 0.55–1.02)
DIFFERENTIAL METHOD BLD: ABNORMAL
EOSINOPHIL # BLD: 0.2 K/UL (ref 0–0.4)
EOSINOPHIL NFR BLD: 3 % (ref 0–7)
ERYTHROCYTE [DISTWIDTH] IN BLOOD BY AUTOMATED COUNT: 13.2 % (ref 11.5–14.5)
GLOBULIN SER CALC-MCNC: 3.9 G/DL (ref 2–4)
GLUCOSE SERPL-MCNC: 121 MG/DL (ref 65–100)
HCT VFR BLD AUTO: 45.1 % (ref 35–47)
HGB BLD-MCNC: 14.3 G/DL (ref 11.5–16)
IMM GRANULOCYTES # BLD AUTO: 0 K/UL (ref 0–0.04)
IMM GRANULOCYTES NFR BLD AUTO: 0 % (ref 0–0.5)
LYMPHOCYTES # BLD: 1.7 K/UL (ref 0.8–3.5)
LYMPHOCYTES NFR BLD: 24 % (ref 12–49)
MCH RBC QN AUTO: 28.8 PG (ref 26–34)
MCHC RBC AUTO-ENTMCNC: 31.7 G/DL (ref 30–36.5)
MCV RBC AUTO: 90.9 FL (ref 80–99)
MONOCYTES # BLD: 0.7 K/UL (ref 0–1)
MONOCYTES NFR BLD: 10 % (ref 5–13)
NEUTS SEG # BLD: 4.2 K/UL (ref 1.8–8)
NEUTS SEG NFR BLD: 62 % (ref 32–75)
NRBC # BLD: 0 K/UL (ref 0–0.01)
NRBC BLD-RTO: 0 PER 100 WBC
PLATELET # BLD AUTO: 435 K/UL (ref 150–400)
PMV BLD AUTO: 9.3 FL (ref 8.9–12.9)
POTASSIUM SERPL-SCNC: 4.4 MMOL/L (ref 3.5–5.1)
PROT SERPL-MCNC: 7.4 G/DL (ref 6.4–8.2)
RBC # BLD AUTO: 4.96 M/UL (ref 3.8–5.2)
SAMPLES BEING HELD,HOLD: NORMAL
SODIUM SERPL-SCNC: 141 MMOL/L (ref 136–145)
WBC # BLD AUTO: 6.8 K/UL (ref 3.6–11)

## 2022-10-10 PROCEDURE — 85025 COMPLETE CBC W/AUTO DIFF WBC: CPT

## 2022-10-10 PROCEDURE — 99285 EMERGENCY DEPT VISIT HI MDM: CPT

## 2022-10-10 PROCEDURE — 36415 COLL VENOUS BLD VENIPUNCTURE: CPT

## 2022-10-10 PROCEDURE — 80053 COMPREHEN METABOLIC PANEL: CPT

## 2022-10-10 PROCEDURE — 70496 CT ANGIOGRAPHY HEAD: CPT

## 2022-10-10 PROCEDURE — 74011000636 HC RX REV CODE- 636: Performed by: EMERGENCY MEDICINE

## 2022-10-10 PROCEDURE — 70450 CT HEAD/BRAIN W/O DYE: CPT

## 2022-10-10 RX ADMIN — IOPAMIDOL 100 ML: 755 INJECTION, SOLUTION INTRAVENOUS at 09:33

## 2022-10-10 NOTE — ED PROVIDER NOTES
Ms. Yuly Alva is an 77yo female who presents to the ER with complaints of headache. She said that her symptoms started 2 days ago. She was outside gardening. She felt a sharp pain in the left top of her head. She said the pain has been present since then. She describes it as constant but she has sudden episodes of very sharp pain in the last second or 2. The pain is radiated from the left top of her head down to her left occiput. It does not go on the right side at all. She does report history of subarachnoid hemorrhage over 20 years ago. However, she said that this pain is different. She does not notice any sensitivity to light. No fevers or chills. No nausea or vomiting. No chest pain or trouble breathing. She denies any other complaints.        Past Medical History:   Diagnosis Date    High triglycerides     Hypertension        Past Surgical History:   Procedure Laterality Date    HX APPENDECTOMY      HX KNEE REPLACEMENT Bilateral     [id number]    HX TUBAL LIGATION           Family History:   Problem Relation Age of Onset    Cancer Sister         cervical    COPD Brother     Hypertension Mother     Hypertension Father     Emphysema Father        Social History     Socioeconomic History    Marital status:      Spouse name: Not on file    Number of children: Not on file    Years of education: Not on file    Highest education level: Not on file   Occupational History    Not on file   Tobacco Use    Smoking status: Never    Smokeless tobacco: Never   Substance and Sexual Activity    Alcohol use: Yes    Drug use: No    Sexual activity: Not Currently   Other Topics Concern    Not on file   Social History Narrative    Not on file     Social Determinants of Health     Financial Resource Strain: Not on file   Food Insecurity: Not on file   Transportation Needs: Not on file   Physical Activity: Not on file   Stress: Not on file   Social Connections: Not on file   Intimate Partner Violence: Not on file Housing Stability: Not on file         ALLERGIES: Amlodipine, Ampicillin, and Lisinopril    Review of Systems   Constitutional:  Negative for chills and fever. HENT:  Negative for rhinorrhea and sore throat. Respiratory:  Negative for cough and shortness of breath. Cardiovascular:  Negative for chest pain. Gastrointestinal:  Negative for abdominal pain, diarrhea, nausea and vomiting. Genitourinary:  Negative for dysuria and urgency. Musculoskeletal:  Negative for arthralgias and back pain. Skin:  Negative for rash. Neurological:  Positive for headaches. Negative for dizziness, weakness and light-headedness. Vitals:    10/10/22 0739   BP: (!) 171/80   Pulse: 73   Resp: 16   Temp: 98 °F (36.7 °C)   SpO2: 99%   Weight: 56.7 kg (125 lb)            Physical Exam     Vital signs reviewed. Nursing notes reviewed. Const:  No acute distress, well developed, well nourished  Head: No rash on the scalp, a small area of very focal tenderness at the left occiput where the pain is reproducible  Eyes:  PERRL, conjunctiva normal, no scleral icterus  Neck:  Supple, trachea midline  Cardiovascular: Regular rate  Resp:  No resp distress, no increased work of breathing  Abd:  Soft, non-tender, non-distended  MSK:  No pedal edema, normal ROM  Neuro:  Alert and oriented x3, no cranial nerve defect  Skin:  Warm, dry, intact  Psych: normal mood and affect, behavior is normal, judgement and thought content is normal        MDM     Amount and/or Complexity of Data Reviewed  Clinical lab tests: ordered and reviewed  Tests in the radiology section of CPT®: ordered and reviewed  Review and summarize past medical records: yes    Patient Progress  Patient progress: stable         Ms. Shi is an 77yo female who presents to the ER with complaints of headache. Her headache seems like it is likely related to inflammation/issue with her occipital nerve. The pain is reproducible to palpation over a small focal area.   No signs of shingles on exam.  No mass or bleed on head CT. Pt. Refused prescriptions for her sx. She states that she does not like to take medicines. She agrees to follow-up with her PCP or return to the ER with new or worsening sx.       Procedures

## 2022-10-10 NOTE — ED TRIAGE NOTES
Pt to ED for c/o L sided head pain which has progressively worsened since Saturday. Describes pain to be sharp and piercing. Took tylenol Saturday with no relief.      Denies cp, sob, dizziness, nausea, head trauma, injury, fall, blood thinners, blurry vision

## 2022-10-12 ENCOUNTER — OFFICE VISIT (OUTPATIENT)
Dept: FAMILY MEDICINE CLINIC | Age: 82
End: 2022-10-12
Payer: MEDICARE

## 2022-10-12 VITALS
DIASTOLIC BLOOD PRESSURE: 63 MMHG | HEART RATE: 70 BPM | HEIGHT: 60 IN | RESPIRATION RATE: 20 BRPM | WEIGHT: 130.2 LBS | BODY MASS INDEX: 25.56 KG/M2 | OXYGEN SATURATION: 98 % | TEMPERATURE: 97.9 F | SYSTOLIC BLOOD PRESSURE: 157 MMHG

## 2022-10-12 DIAGNOSIS — I10 HYPERTENSION, UNSPECIFIED TYPE: ICD-10-CM

## 2022-10-12 DIAGNOSIS — G44.86 CERVICOGENIC HEADACHE: Primary | ICD-10-CM

## 2022-10-12 PROCEDURE — 99213 OFFICE O/P EST LOW 20 MIN: CPT | Performed by: STUDENT IN AN ORGANIZED HEALTH CARE EDUCATION/TRAINING PROGRAM

## 2022-10-12 PROCEDURE — G8427 DOCREV CUR MEDS BY ELIG CLIN: HCPCS | Performed by: STUDENT IN AN ORGANIZED HEALTH CARE EDUCATION/TRAINING PROGRAM

## 2022-10-12 PROCEDURE — G8432 DEP SCR NOT DOC, RNG: HCPCS | Performed by: STUDENT IN AN ORGANIZED HEALTH CARE EDUCATION/TRAINING PROGRAM

## 2022-10-12 PROCEDURE — 1101F PT FALLS ASSESS-DOCD LE1/YR: CPT | Performed by: STUDENT IN AN ORGANIZED HEALTH CARE EDUCATION/TRAINING PROGRAM

## 2022-10-12 PROCEDURE — 1123F ACP DISCUSS/DSCN MKR DOCD: CPT | Performed by: STUDENT IN AN ORGANIZED HEALTH CARE EDUCATION/TRAINING PROGRAM

## 2022-10-12 PROCEDURE — G8536 NO DOC ELDER MAL SCRN: HCPCS | Performed by: STUDENT IN AN ORGANIZED HEALTH CARE EDUCATION/TRAINING PROGRAM

## 2022-10-12 PROCEDURE — G8400 PT W/DXA NO RESULTS DOC: HCPCS | Performed by: STUDENT IN AN ORGANIZED HEALTH CARE EDUCATION/TRAINING PROGRAM

## 2022-10-12 PROCEDURE — G8754 DIAS BP LESS 90: HCPCS | Performed by: STUDENT IN AN ORGANIZED HEALTH CARE EDUCATION/TRAINING PROGRAM

## 2022-10-12 PROCEDURE — G8417 CALC BMI ABV UP PARAM F/U: HCPCS | Performed by: STUDENT IN AN ORGANIZED HEALTH CARE EDUCATION/TRAINING PROGRAM

## 2022-10-12 PROCEDURE — 1090F PRES/ABSN URINE INCON ASSESS: CPT | Performed by: STUDENT IN AN ORGANIZED HEALTH CARE EDUCATION/TRAINING PROGRAM

## 2022-10-12 PROCEDURE — G8753 SYS BP > OR = 140: HCPCS | Performed by: STUDENT IN AN ORGANIZED HEALTH CARE EDUCATION/TRAINING PROGRAM

## 2022-10-12 RX ORDER — HYDROCHLOROTHIAZIDE 25 MG/1
25 TABLET ORAL DAILY
COMMUNITY
End: 2022-11-04 | Stop reason: SDUPTHER

## 2022-10-12 NOTE — PROGRESS NOTES
Identified pt with two pt identifiers(name and ). Reviewed record in preparation for visit and have obtained necessary documentation. Chief Complaint   Patient presents with    Head Pain     Patient states pain on left side of brain and its a 12 out of 10 in pain        Health Maintenance Due   Topic    Shingrix Vaccine Age 50> (1 of 2)    Bone Densitometry (Dexa) Screening     Pneumococcal 65+ years (1 - PCV)    Flu Vaccine (1)       Visit Vitals  BP (!) 157/63 (BP 1 Location: Right upper arm, BP Patient Position: Sitting, BP Cuff Size: Small adult)   Pulse 70   Temp 97.9 °F (36.6 °C) (Oral)   Resp 20   Ht 5' (1.524 m)   Wt 130 lb 3.2 oz (59.1 kg)   SpO2 98%   BMI 25.43 kg/m²         Coordination of Care Questionnaire:  :   1) Have you been to an emergency room, urgent care, or hospitalized since your last visit? If yes, where when, and reason for visit? Yes, 10/10/22, headache      2. Have seen or consulted any other health care provider since your last visit? If yes, where when, and reason for visit? NO        Patient is accompanied by self I have received verbal consent from [facility] to discuss any/all medical information while they are present in the room. Notified physician of elevated BP and rechecked at this time, remains elevated.

## 2022-10-12 NOTE — PROGRESS NOTES
Klaudia Rivers is a 78 y.o. female   Chief Complaint   Patient presents with    Head Pain     Patient states pain on left side of brain and its a 12 out of 10 in pain         ASSESSMENT AND PLAN:    1. Cervicogenic headache  Suspect may be musculoskeletal given strenuous activity prior to onset. Patient neurologically intact with no obvious deficits on exam. Reviewed CT imaging from prior ED visit which was unremarkable. Do not suspect BP to be significant contributing factor to symptoms at this time; however, will address as noted below. - advised to use Tylenol prn for pain   - counseled on avoidance of NSAIDS due to renal disease   - given strict return to ED precautions for new or worsening symptoms   - provided handout with cervical exercises/stretches to help alleviate pain   - follow up in 2 weeks     2. Hypertension   BP elevated with some improvement on repeat. Reported home range 130s-150s but measures infrequently. Discussed altering medication regimen. Patient declines at this time. Has been on amlodipine in the past but discontinued due to edema. - plan to log BP at home over the next two weeks   - pending BP trend will adjust regimen. Want to avoid aggressive management given age       SUBJECTIVE:    HPI:  Bari Iglesias. Bharati Ricci is a 78 y.o. female who presents for ED follow up. Left side headache:   - onset 4 days ago, localized to the left side and now intermittent. No alleviating or exacerbating factors. Has taken ibuprofen with some improvement   - was doing strenuous work outside in the yard, prior to onset of pain    - was seen in the ED on the subsequent day and was advised may be musculoskeletal, has been taking 200mg Ibuprofen with some relief. Reports refused Rx for steroids   - does have h/o subarachnoid hemorrhage (1998) attributed to celebrex and TIA in 2019 but states current symptoms feel different   - does have established h/o HTN and currently on HCTZ and Losartan. Intermittently checks BP at home but does note it runs high, occasionally down to SBP 130s   - previously on statin in the past for HLD but patient elected to discontinue   - no sensitivity to light, nausea, dizziness, lightheadedness, visual changes, decreased strength, speech changes. No h/o migraines or frequent headaches. ROS   General: No fevers. No chills. HENT: No congestion. No rhinorrhea. No sore throat. Eyes: No eye pain. No eye redness. Respiratory: No cough. No shortness of breath. Cardio: No chest pain. No leg swelling. No palpitations. GI: No abd pain. No n/v. No diarrhea. No constipation. : No frequency. No dysuria. No urgency. MSK: No back pain. No neck pain. Neuro: No headaches. No dizziness. Current Outpatient Medications:     hydroCHLOROthiazide (HYDRODIURIL) 25 mg tablet, Take 25 mg by mouth daily. , Disp: , Rfl:     diphenhydrAMINE (BENADRYL) 25 mg capsule, Take 25 mg by mouth every six (6) hours as needed. , Disp: , Rfl:     losartan (COZAAR) 50 mg tablet, Take  by mouth daily. , Disp: , Rfl:     hydrocortisone (CORTAID) 1 % topical cream, Apply  to affected area two (2) times a day. use thin layer, Disp: 30 g, Rfl: 0    b complex vitamins tablet, Take 1 Tab by mouth daily. , Disp: , Rfl:     Past Medical History:   Diagnosis Date    High triglycerides     Hypertension        Past Surgical History:   Procedure Laterality Date    HX APPENDECTOMY      HX KNEE REPLACEMENT Bilateral     [id number]    HX TUBAL LIGATION         Social History     Socioeconomic History    Marital status:      Spouse name: Not on file    Number of children: Not on file    Years of education: Not on file    Highest education level: Not on file   Occupational History    Not on file   Tobacco Use    Smoking status: Never    Smokeless tobacco: Never   Vaping Use    Vaping Use: Never used   Substance and Sexual Activity    Alcohol use: Yes    Drug use: No    Sexual activity: Not Currently Other Topics Concern    Not on file   Social History Narrative    Not on file     Social Determinants of Health     Financial Resource Strain: Not on file   Food Insecurity: Not on file   Transportation Needs: Not on file   Physical Activity: Not on file   Stress: Not on file   Social Connections: Not on file   Intimate Partner Violence: Not on file   Housing Stability: Not on file       OBJECTIVE:  BP (!) 157/63 (BP 1 Location: Right upper arm, BP Patient Position: Sitting, BP Cuff Size: Small adult)   Pulse 70   Temp 97.9 °F (36.6 °C) (Oral)   Resp 20   Ht 5' (1.524 m)   Wt 130 lb 3.2 oz (59.1 kg)   SpO2 98%   BMI 25.43 kg/m²     Physical Exam  Vitals and nursing note reviewed. Constitutional:       General: She is not in acute distress. HENT:      Head: Normocephalic and atraumatic. Mouth/Throat:      Mouth: Mucous membranes are moist.      Pharynx: Oropharynx is clear. Eyes:      Conjunctiva/sclera: Conjunctivae normal.      Pupils: Pupils are equal, round, and reactive to light. Cardiovascular:      Rate and Rhythm: Normal rate and regular rhythm. Pulses: Normal pulses. Heart sounds: No murmur heard. No friction rub. No gallop. Pulmonary:      Effort: Pulmonary effort is normal.      Breath sounds: Normal breath sounds. Skin:     General: Skin is warm and dry. Capillary Refill: Capillary refill takes less than 2 seconds. Findings: No rash. Neurological:      General: No focal deficit present. Mental Status: She is alert and oriented to person, place, and time. Cranial Nerves: No cranial nerve deficit. Sensory: No sensory deficit. Motor: No weakness.       Coordination: Coordination normal.      Gait: Gait normal.   Psychiatric:         Mood and Affect: Mood normal.         Behavior: Behavior normal.

## 2022-10-24 ENCOUNTER — OFFICE VISIT (OUTPATIENT)
Dept: FAMILY MEDICINE CLINIC | Age: 82
End: 2022-10-24
Payer: MEDICARE

## 2022-10-24 VITALS
TEMPERATURE: 97.8 F | DIASTOLIC BLOOD PRESSURE: 70 MMHG | BODY MASS INDEX: 25.32 KG/M2 | HEART RATE: 66 BPM | RESPIRATION RATE: 16 BRPM | SYSTOLIC BLOOD PRESSURE: 177 MMHG | WEIGHT: 129 LBS | HEIGHT: 60 IN | OXYGEN SATURATION: 95 %

## 2022-10-24 DIAGNOSIS — I10 HYPERTENSION, UNSPECIFIED TYPE: Primary | ICD-10-CM

## 2022-10-24 DIAGNOSIS — G44.86 CERVICOGENIC HEADACHE: ICD-10-CM

## 2022-10-24 PROCEDURE — G8432 DEP SCR NOT DOC, RNG: HCPCS | Performed by: STUDENT IN AN ORGANIZED HEALTH CARE EDUCATION/TRAINING PROGRAM

## 2022-10-24 PROCEDURE — 1101F PT FALLS ASSESS-DOCD LE1/YR: CPT | Performed by: STUDENT IN AN ORGANIZED HEALTH CARE EDUCATION/TRAINING PROGRAM

## 2022-10-24 PROCEDURE — G8754 DIAS BP LESS 90: HCPCS | Performed by: STUDENT IN AN ORGANIZED HEALTH CARE EDUCATION/TRAINING PROGRAM

## 2022-10-24 PROCEDURE — G8400 PT W/DXA NO RESULTS DOC: HCPCS | Performed by: STUDENT IN AN ORGANIZED HEALTH CARE EDUCATION/TRAINING PROGRAM

## 2022-10-24 PROCEDURE — 3078F DIAST BP <80 MM HG: CPT | Performed by: STUDENT IN AN ORGANIZED HEALTH CARE EDUCATION/TRAINING PROGRAM

## 2022-10-24 PROCEDURE — 1090F PRES/ABSN URINE INCON ASSESS: CPT | Performed by: STUDENT IN AN ORGANIZED HEALTH CARE EDUCATION/TRAINING PROGRAM

## 2022-10-24 PROCEDURE — G8536 NO DOC ELDER MAL SCRN: HCPCS | Performed by: STUDENT IN AN ORGANIZED HEALTH CARE EDUCATION/TRAINING PROGRAM

## 2022-10-24 PROCEDURE — 3075F SYST BP GE 130 - 139MM HG: CPT | Performed by: STUDENT IN AN ORGANIZED HEALTH CARE EDUCATION/TRAINING PROGRAM

## 2022-10-24 PROCEDURE — G8417 CALC BMI ABV UP PARAM F/U: HCPCS | Performed by: STUDENT IN AN ORGANIZED HEALTH CARE EDUCATION/TRAINING PROGRAM

## 2022-10-24 PROCEDURE — 99213 OFFICE O/P EST LOW 20 MIN: CPT | Performed by: STUDENT IN AN ORGANIZED HEALTH CARE EDUCATION/TRAINING PROGRAM

## 2022-10-24 PROCEDURE — G8753 SYS BP > OR = 140: HCPCS | Performed by: STUDENT IN AN ORGANIZED HEALTH CARE EDUCATION/TRAINING PROGRAM

## 2022-10-24 PROCEDURE — 1123F ACP DISCUSS/DSCN MKR DOCD: CPT | Performed by: STUDENT IN AN ORGANIZED HEALTH CARE EDUCATION/TRAINING PROGRAM

## 2022-10-24 PROCEDURE — G8427 DOCREV CUR MEDS BY ELIG CLIN: HCPCS | Performed by: STUDENT IN AN ORGANIZED HEALTH CARE EDUCATION/TRAINING PROGRAM

## 2022-10-24 RX ORDER — OLOPATADINE HYDROCHLORIDE 2 MG/ML
1 SOLUTION/ DROPS OPHTHALMIC DAILY
COMMUNITY

## 2022-10-24 NOTE — PROGRESS NOTES
Misty Jimenez is a 78 y.o. female   Chief Complaint   Patient presents with    Headache     Patient is coming in for a follow up on headaches. She states that her headaches are getting better. No other concerns. ASSESSMENT AND PLAN:    1. Hypertension, unspecified type  Chronic, not well controlled. Elevated during visit today though appears to be lower consistently at home. Discussed altering or increasing BP regimen to achieve better control but patient declines changes at this time. - will continue to monitor outpatient blood pressures; discuss regimen again at next visit pending BP trend   - want to avoid aggressive management given age     2. Cervicogenic headache  Resolved s/p initiation of home PT exercises. Patient discontinued Tylenol due to GI upset. - continue home PT exercises      SUBJECTIVE:    HPI:  Anshu Santos. [facility] is a 78 y.o. female who presents      HTN:   - BP ranging 140s-150s/60s at home   - currently on HCTZ 25 daily and losartan 50 daily   - tolerating medication well, denies side effects     Headache:   - currently asymptomatic   - reports resolution of headache s/p starting PT home exercises since last visit   - denies fevers, chills, shortness of breath, chest pain, dizziness, visual changes      ROS   General: No fevers. No chills. HENT: No congestion. No rhinorrhea. No sore throat. Eyes: No eye pain. No eye redness. Respiratory: No cough. No shortness of breath. Cardio: No chest pain. No leg swelling. No palpitations. GI: No abd pain. No n/v. No diarrhea. No constipation. : No frequency. No dysuria. No urgency. MSK: No back pain. No neck pain. Neuro: No headaches. No dizziness. Current Outpatient Medications:     olopatadine (Pataday) 0.2 % drop ophthalmic solution, Administer 1 Drop to both eyes daily. , Disp: , Rfl:     hydroCHLOROthiazide (HYDRODIURIL) 25 mg tablet, Take 25 mg by mouth daily. , Disp: , Rfl:     losartan (COZAAR) 50 mg tablet, Take  by mouth daily. , Disp: , Rfl:     b complex vitamins tablet, Take 1 Tab by mouth daily. , Disp: , Rfl:     diphenhydrAMINE (BENADRYL) 25 mg capsule, Take 25 mg by mouth every six (6) hours as needed. (Patient not taking: Reported on 10/24/2022), Disp: , Rfl:     hydrocortisone (CORTAID) 1 % topical cream, Apply  to affected area two (2) times a day. use thin layer (Patient not taking: Reported on 10/24/2022), Disp: 30 g, Rfl: 0    Past Medical History:   Diagnosis Date    High triglycerides     Hypertension        Past Surgical History:   Procedure Laterality Date    HX APPENDECTOMY      HX KNEE REPLACEMENT Bilateral     [id number]    HX TUBAL LIGATION         Social History     Socioeconomic History    Marital status:      Spouse name: Not on file    Number of children: Not on file    Years of education: Not on file    Highest education level: Not on file   Occupational History    Not on file   Tobacco Use    Smoking status: Never    Smokeless tobacco: Never   Vaping Use    Vaping Use: Never used   Substance and Sexual Activity    Alcohol use: Yes    Drug use: No    Sexual activity: Not Currently   Other Topics Concern    Not on file   Social History Narrative    Not on file     Social Determinants of Health     Financial Resource Strain: Not on file   Food Insecurity: Not on file   Transportation Needs: Not on file   Physical Activity: Not on file   Stress: Not on file   Social Connections: Not on file   Intimate Partner Violence: Not on file   Housing Stability: Not on file       OBJECTIVE:  BP (!) 177/70 (BP 1 Location: Left upper arm, BP Patient Position: Sitting)   Pulse 66   Temp 97.8 °F (36.6 °C) (Oral)   Resp 16   Ht 5' (1.524 m)   Wt 58.5 kg (129 lb)   SpO2 95%   BMI 25.19 kg/m²     Physical Exam  Vitals and nursing note reviewed. Constitutional:       General: She is not in acute distress. HENT:      Head: Normocephalic and atraumatic.       Mouth/Throat:      Mouth: Mucous membranes are moist.      Pharynx: Oropharynx is clear. Eyes:      Conjunctiva/sclera: Conjunctivae normal.      Pupils: Pupils are equal, round, and reactive to light. Cardiovascular:      Rate and Rhythm: Normal rate and regular rhythm. Heart sounds: No murmur heard. No friction rub. No gallop. Pulmonary:      Effort: Pulmonary effort is normal.      Breath sounds: Normal breath sounds. Skin:     General: Skin is warm and dry. Capillary Refill: Capillary refill takes less than 2 seconds. Neurological:      General: No focal deficit present. Mental Status: She is alert.

## 2022-10-24 NOTE — PROGRESS NOTES
Bill Gregory is a 78 y.o. female    Chief Complaint   Patient presents with    Headache     Patient is coming in for a follow up on headaches. She states that her headaches are getting better. No other concerns. 1. Have you been to the ER, urgent care clinic since your last visit? Hospitalized since your last visit? No    2. Have you seen or consulted any other health care providers outside of the [address] [place] [address] since your last visit? Include any pap smears or colon screening. No    Vitals:    10/24/22 1538 10/24/22 1550   BP: (!) 162/67 (!) 177/70   BP 1 Location: Right upper arm Left upper arm   BP Patient Position: Sitting Sitting   Pulse: 66    Temp: 97.8 °F (36.6 °C)    TempSrc: Oral    Resp: 16    Height: 5' (1.524 m)    Weight: 129 lb (58.5 kg)    SpO2: 95%               Health Maintenance Due   Topic Date Due    Shingrix Vaccine Age 49> (1 of 2) Never done    Bone Densitometry (Dexa) Screening  Never done    Pneumococcal 65+ years (1 - PCV) Never done    Flu Vaccine (1) Never done         Medication Reconciliation completed, changes noted.   Please  Update medication list.

## 2022-10-26 ENCOUNTER — PATIENT OUTREACH (OUTPATIENT)
Dept: CASE MANAGEMENT | Age: 82
End: 2022-10-26

## 2022-10-27 NOTE — PROGRESS NOTES
Ambulatory Care Management Note    Date/Time:  10/27/2022 9:56 AM    This patient was received as a referral from [facility]. Ambulatory Care Manager outreached to patient today to offer care management services. Introduction to self and role of care manager provided. Patient declined care management services at this time. No follow up call scheduled at this time. Patient has Ambulatory Care Manager's contact number for any questions or concerns.

## 2022-11-04 RX ORDER — HYDROCHLOROTHIAZIDE 25 MG/1
25 TABLET ORAL DAILY
Qty: 90 TABLET | Refills: 0 | Status: SHIPPED | OUTPATIENT
Start: 2022-11-04 | End: 2022-11-13

## 2022-11-04 RX ORDER — LOSARTAN POTASSIUM 50 MG/1
50 TABLET ORAL DAILY
Qty: 90 TABLET | Refills: 0 | Status: SHIPPED | OUTPATIENT
Start: 2022-11-04 | End: 2023-01-16

## 2022-11-04 NOTE — TELEPHONE ENCOUNTER
Patient is requesting refills for losartan and hydrochlorothiazide that will last through her vacation. She says the losartan will run out before she leaves and she is completely out of hydrochlorothiazide. She leaves around December 5th or 6th.

## 2023-01-16 ENCOUNTER — OFFICE VISIT (OUTPATIENT)
Dept: FAMILY MEDICINE CLINIC | Age: 83
End: 2023-01-16
Payer: MEDICARE

## 2023-01-16 VITALS
TEMPERATURE: 97.1 F | HEIGHT: 60 IN | BODY MASS INDEX: 24.94 KG/M2 | DIASTOLIC BLOOD PRESSURE: 69 MMHG | OXYGEN SATURATION: 99 % | WEIGHT: 127 LBS | HEART RATE: 63 BPM | RESPIRATION RATE: 17 BRPM | SYSTOLIC BLOOD PRESSURE: 180 MMHG

## 2023-01-16 DIAGNOSIS — R20.0 NUMBNESS AND TINGLING OF BOTH FEET: ICD-10-CM

## 2023-01-16 DIAGNOSIS — R73.03 PREDIABETES: Primary | ICD-10-CM

## 2023-01-16 DIAGNOSIS — R20.2 NUMBNESS AND TINGLING OF BOTH FEET: ICD-10-CM

## 2023-01-16 DIAGNOSIS — R10.9 LEFT FLANK PAIN: ICD-10-CM

## 2023-01-16 DIAGNOSIS — Z13.220 SCREENING CHOLESTEROL LEVEL: ICD-10-CM

## 2023-01-16 DIAGNOSIS — I10 HYPERTENSION, UNSPECIFIED TYPE: ICD-10-CM

## 2023-01-16 LAB
BILIRUB UR QL STRIP: NEGATIVE
GLUCOSE UR-MCNC: NEGATIVE MG/DL
KETONES P FAST UR STRIP-MCNC: NORMAL MG/DL
PH UR STRIP: 7 [PH] (ref 4.6–8)
PROT UR QL STRIP: NORMAL
SP GR UR STRIP: 1.02 (ref 1–1.03)
UA UROBILINOGEN AMB POC: NORMAL (ref 0.2–1)
URINALYSIS CLARITY POC: NORMAL
URINALYSIS COLOR POC: YELLOW
URINE BLOOD POC: NEGATIVE
URINE LEUKOCYTES POC: NORMAL
URINE NITRITES POC: NEGATIVE

## 2023-01-16 RX ORDER — LOSARTAN POTASSIUM 100 MG/1
100 TABLET ORAL DAILY
Qty: 30 TABLET | Refills: 0 | Status: SHIPPED | OUTPATIENT
Start: 2023-01-16

## 2023-01-16 NOTE — PROGRESS NOTES
Chief Complaint   Patient presents with    Labs     A1C    Peripheral Neuropathy     feet     1. Have you been to the ER, urgent care clinic since your last visit? Hospitalized since your last visit? No    2. Have you seen or consulted any other health care providers outside of the [address] [place] [address] since your last visit? Include any pap smears or colon screening.  No

## 2023-01-16 NOTE — PROGRESS NOTES
Deandre Hall is a 78 y.o. female   Chief Complaint   Patient presents with    Labs     A1C    Peripheral Neuropathy     feet         ASSESSMENT AND PLAN:    1. Prediabetes  - HEMOGLOBIN A1C WITH EAG; Future  - HEMOGLOBIN A1C WITH EAG    2. Numbness and tingling of both feet  Monofilament testing unremarkable. Will check A1c and B12 to rule out potential underlying causes of peripheral neuropathy. - continue conservative management per HPI as this is providing relief to patient. - if labs unremarkable, may consider nerve conduction studies   - HEMOGLOBIN A1C WITH EAG; Future  - VITAMIN B12; Future  - VITAMIN B12  - HEMOGLOBIN A1C WITH EAG    3. Left flank pain  Will check baseline labs to evaluate renal function. Ddx includes effect of known renal stone though suspect MSK etiology may be more likely. Provided handout on home exercises/stretching.   - continue urology follow up as scheduled   - Discussed ER/return precautions for new or worsening symptoms   - METABOLIC PANEL, BASIC; Future  - AMB POC URINALYSIS DIP STICK AUTO W/O MICRO  - METABOLIC PANEL, BASIC    4. Screening cholesterol level  - LIPID PANEL; Future  - LIPID PANEL    5. Hypertension, unspecified type  Uncontrolled. Discussed medication adjustment for better BP control and patient agreeable to plan. Continue HCTZ. Increase Losartan to 100mg daily.   - Follow up in 1 week for BP check. - Advised patient to monitor BP daily at home and bring cuff in for follow up. - losartan (COZAAR) 100 mg tablet; Take 1 Tablet by mouth daily. Dispense: 30 Tablet; Refill: 0    SUBJECTIVE:    HPI:  Autumn Chung. Tonja Childers is a 78 y.o. female who presents for chronic condition follow up.      #htn   - taking HCTZ 25mg daily and losartan 50mg daily    - BP ranging 130s-150s/80-90s at home   - patient asymptomatic   - tolerating medications well, denies side effects     #prediabetes   - has had elevated A1c in the past   - does not subjective weight gain   - nutrition: chicken, fish, has been watching sodium intake   - daughter does have diabetes but patient has no other family h/o DM   - would like A1c checked today     #Left flank pain   - this is a chronic problem, ongoing over 1 year  - pain is intermittent, localized and does not radiate   - does not has left renal stone and is being followed by urology, next appt is Feb 1   - movement and changes in position particularly twisting motion can exacerbated symptoms   - no heat or medications used for the pain   - denies fevers, abd pain, dysuria, blood in urine     #bilateral foot numbness    - has tingling/numbness/burning sensation to bilateral feet  - has been chronic, ongoing for 3 years   - has been using feet massage rollers with some improvement   - no skin changes, rashes, discoloration    General: No fevers. No chills. HENT: No congestion. No rhinorrhea. No sore throat. Eyes: No eye pain. No eye redness. Respiratory: No cough. No shortness of breath. Cardio: No chest pain. No leg swelling. No palpitations. GI: No abd pain. No n/v. No diarrhea. No constipation. : No frequency. No dysuria. No urgency. MSK: No back pain. No neck pain. Neuro: No headaches. No dizziness. Current Outpatient Medications:     losartan (COZAAR) 100 mg tablet, Take 1 Tablet by mouth daily. , Disp: 30 Tablet, Rfl: 0    hydroCHLOROthiazide (HYDRODIURIL) 25 mg tablet, Take 1 tablet by mouth once daily for 30 days, Disp: 30 Tablet, Rfl: 3    olopatadine (PATADAY) 0.2 % drop ophthalmic solution, Administer 1 Drop to both eyes daily. , Disp: , Rfl:     Past Medical History:   Diagnosis Date    High triglycerides     Hypertension        Past Surgical History:   Procedure Laterality Date    HX APPENDECTOMY      HX KNEE REPLACEMENT Bilateral     [id number]    HX TUBAL LIGATION         Social History     Socioeconomic History    Marital status:      Spouse name: Not on file    Number of children: Not on file Years of education: Not on file    Highest education level: Not on file   Occupational History    Not on file   Tobacco Use    Smoking status: Never    Smokeless tobacco: Never   Vaping Use    Vaping Use: Never used   Substance and Sexual Activity    Alcohol use: Yes    Drug use: No    Sexual activity: Not Currently   Other Topics Concern    Not on file   Social History Narrative    Not on file     Social Determinants of Health     Financial Resource Strain: Not on file   Food Insecurity: Not on file   Transportation Needs: Not on file   Physical Activity: Not on file   Stress: Not on file   Social Connections: Not on file   Intimate Partner Violence: Not on file   Housing Stability: Not on file       OBJECTIVE:  BP (!) 180/69   Pulse 63   Temp 97.1 °F (36.2 °C) (Temporal)   Resp 17   Ht 5' (1.524 m)   Wt 127 lb (57.6 kg)   SpO2 99%   BMI 24.80 kg/m²     Physical Exam  Vitals and nursing note reviewed. HENT:      Head: Normocephalic and atraumatic. Mouth/Throat:      Mouth: Mucous membranes are moist.      Pharynx: Oropharynx is clear. Eyes:      Conjunctiva/sclera: Conjunctivae normal.      Pupils: Pupils are equal, round, and reactive to light. Cardiovascular:      Rate and Rhythm: Normal rate and regular rhythm. Heart sounds: No murmur heard. No friction rub. No gallop. Pulmonary:      Effort: Pulmonary effort is normal. No respiratory distress. Breath sounds: Normal breath sounds. No wheezing, rhonchi or rales. Skin:     General: Skin is warm and dry. Capillary Refill: Capillary refill takes less than 2 seconds. Neurological:      General: No focal deficit present. Mental Status: She is alert. Foot exam: No discoloration, deformity, skin changes, or lesions noted to feet bilaterally. DP and PT pulses 2+ intact bilaterally. Monofilament testing negative.

## 2023-01-17 LAB
ANION GAP SERPL CALC-SCNC: 3 MMOL/L (ref 5–15)
BUN SERPL-MCNC: 22 MG/DL (ref 6–20)
BUN/CREAT SERPL: 21 (ref 12–20)
CALCIUM SERPL-MCNC: 10.2 MG/DL (ref 8.5–10.1)
CHLORIDE SERPL-SCNC: 106 MMOL/L (ref 97–108)
CHOLEST SERPL-MCNC: 267 MG/DL
CO2 SERPL-SCNC: 31 MMOL/L (ref 21–32)
CREAT SERPL-MCNC: 1.04 MG/DL (ref 0.55–1.02)
EST. AVERAGE GLUCOSE BLD GHB EST-MCNC: 128 MG/DL
GLUCOSE SERPL-MCNC: 95 MG/DL (ref 65–100)
HBA1C MFR BLD: 6.1 % (ref 4–5.6)
HDLC SERPL-MCNC: 62 MG/DL
HDLC SERPL: 4.3 (ref 0–5)
LDLC SERPL CALC-MCNC: 181.4 MG/DL (ref 0–100)
POTASSIUM SERPL-SCNC: 4.8 MMOL/L (ref 3.5–5.1)
SODIUM SERPL-SCNC: 140 MMOL/L (ref 136–145)
TRIGL SERPL-MCNC: 118 MG/DL (ref ?–150)
VIT B12 SERPL-MCNC: 686 PG/ML (ref 193–986)
VLDLC SERPL CALC-MCNC: 23.6 MG/DL

## 2023-01-18 NOTE — PROGRESS NOTES
UA 1+ LE, neg blood. BMP stable. Tchol 267. . HDL 62. ASCVD elevated ~29%. B12 wnl. A1c 6.1 (prediabetic). Will discuss results at follow up next week. Consider statin therapy given increased CV risk.

## 2023-01-26 ENCOUNTER — OFFICE VISIT (OUTPATIENT)
Dept: FAMILY MEDICINE CLINIC | Age: 83
End: 2023-01-26
Payer: MEDICARE

## 2023-01-26 VITALS
OXYGEN SATURATION: 97 % | SYSTOLIC BLOOD PRESSURE: 189 MMHG | WEIGHT: 129 LBS | TEMPERATURE: 98.3 F | BODY MASS INDEX: 25.19 KG/M2 | HEART RATE: 61 BPM | DIASTOLIC BLOOD PRESSURE: 51 MMHG

## 2023-01-26 DIAGNOSIS — I10 WHITE COAT SYNDROME WITH DIAGNOSIS OF HYPERTENSION: Primary | ICD-10-CM

## 2023-01-26 DIAGNOSIS — E78.5 HYPERLIPIDEMIA, UNSPECIFIED HYPERLIPIDEMIA TYPE: ICD-10-CM

## 2023-01-26 NOTE — PROGRESS NOTES
James Melvin is a 78 y.o. female   Chief Complaint   Patient presents with    Blood Pressure Check     BP check at home systolic ranging from 468 to 315'T, diastolic 67-71         ASSESSMENT AND PLAN:    1. Hyperlipidemia, unspecified hyperlipidemia type  Discussed recent lab results with patient. Discussed elevated ASCVD risk given multiple chronic medication conditions and discussed use of statin medication to address and reduce CV risk. - However, given patient's age and activity level, she declines statin therapy at this time and wishes to continue addressing with exercise and nutrition management. - patient counseled on nutrition recommendations including increased fiber intake     2. White coat syndrome with diagnosis of hypertension  BP elevated in office; however, appears better controlled per home BP log with few low DBP measurements since medication adjustment. Patient asymptomatic. Home BP cuff evaluated in clinic today and appears consistent with office measurements. Suspect white coat syndrome contributing to elevation in office and will continue with current management   - continue losartan 100mg, HCTZ 25mg daily    SUBJECTIVE:    HPI:  Aisha Moore is a 78 y.o. female who presents for BP check. #HTN  - currently taking losartan 100mg (increased from 50mg) and hctz 25mg   - tolerating medications well, no side effects   - BP ranging 130-150/40-70 at home   - had headaches in the past but these have resolved     #HLD   - noted persistent on recent lab tests   - patient is not on statin medication   - patient reports she would like to defer new medication at this time     ROS   General: No fevers. No chills. HENT: No congestion. No rhinorrhea. No sore throat. Eyes: No eye pain. No eye redness. Respiratory: No cough. No shortness of breath. Cardio: No chest pain. No leg swelling. No palpitations. GI: No abd pain. No n/v. No diarrhea. No constipation. : No frequency.  No dysuria. No urgency. MSK: No back pain. No neck pain. Neuro: No headaches. No dizziness. Current Outpatient Medications:     losartan (COZAAR) 100 mg tablet, Take 1 Tablet by mouth daily. , Disp: 30 Tablet, Rfl: 0    hydroCHLOROthiazide (HYDRODIURIL) 25 mg tablet, Take 1 tablet by mouth once daily for 30 days, Disp: 30 Tablet, Rfl: 3    olopatadine (PATADAY) 0.2 % drop ophthalmic solution, Administer 1 Drop to both eyes daily. , Disp: , Rfl:     Past Medical History:   Diagnosis Date    High triglycerides     Hypertension        Past Surgical History:   Procedure Laterality Date    HX APPENDECTOMY      HX KNEE REPLACEMENT Bilateral     [id number]    HX TUBAL LIGATION         Social History     Socioeconomic History    Marital status:      Spouse name: Not on file    Number of children: Not on file    Years of education: Not on file    Highest education level: Not on file   Occupational History    Not on file   Tobacco Use    Smoking status: Never    Smokeless tobacco: Never   Vaping Use    Vaping Use: Never used   Substance and Sexual Activity    Alcohol use: Yes    Drug use: No    Sexual activity: Not Currently   Other Topics Concern    Not on file   Social History Narrative    Not on file     Social Determinants of Health     Financial Resource Strain: Not on file   Food Insecurity: Not on file   Transportation Needs: Not on file   Physical Activity: Not on file   Stress: Not on file   Social Connections: Not on file   Intimate Partner Violence: Not on file   Housing Stability: Not on file       OBJECTIVE:  BP (!) 189/51   Pulse 61   Temp 98.3 °F (36.8 °C) (Oral)   Wt 129 lb (58.5 kg)   SpO2 97%   BMI 25.19 kg/m²     Physical Exam  Vitals and nursing note reviewed. Constitutional:       General: She is not in acute distress. HENT:      Head: Normocephalic and atraumatic. Eyes:      Conjunctiva/sclera: Conjunctivae normal.      Pupils: Pupils are equal, round, and reactive to light. Cardiovascular:      Rate and Rhythm: Normal rate and regular rhythm. Pulmonary:      Effort: Pulmonary effort is normal.      Breath sounds: Normal breath sounds. Skin:     General: Skin is warm and dry. Capillary Refill: Capillary refill takes less than 2 seconds. Neurological:      General: No focal deficit present. Mental Status: She is alert.

## 2023-01-26 NOTE — PROGRESS NOTES
Identified pt with two pt identifiers(name and ). Reviewed record in preparation for visit and have obtained necessary documentation. All patient medications has been reviewed. Chief Complaint   Patient presents with    Blood Pressure Check     BP check at home systolic ranging from 085 to 598'L, diastolic 35-04     Vitals:    23 1051 23 1055 23 1118   BP: (!) 198/62 (!) 181/62 (!) 189/51   Pulse: 61     Temp: 98.3 °F (36.8 °C)     TempSrc: Oral     SpO2: 97%     Weight: 129 lb (58.5 kg)         1. Have you been to the ER, urgent care clinic since your last visit? Hospitalized since your last visit? No    2. Have you seen or consulted any other health care providers outside of the [address] [place] [address] since your last visit? Include any pap smears or colon screening.  No

## 2023-02-13 DIAGNOSIS — I10 HYPERTENSION, UNSPECIFIED TYPE: ICD-10-CM

## 2023-02-15 RX ORDER — HYDROCHLOROTHIAZIDE 25 MG/1
TABLET ORAL
Qty: 90 TABLET | Refills: 0 | Status: SHIPPED | OUTPATIENT
Start: 2023-02-15

## 2023-02-15 RX ORDER — LOSARTAN POTASSIUM 100 MG/1
100 TABLET ORAL DAILY
Qty: 90 TABLET | Refills: 0 | Status: SHIPPED | OUTPATIENT
Start: 2023-02-15

## 2023-03-30 ENCOUNTER — OFFICE VISIT (OUTPATIENT)
Dept: FAMILY MEDICINE CLINIC | Age: 83
End: 2023-03-30

## 2023-03-30 VITALS
TEMPERATURE: 97.5 F | HEIGHT: 60 IN | WEIGHT: 127 LBS | SYSTOLIC BLOOD PRESSURE: 174 MMHG | HEART RATE: 64 BPM | RESPIRATION RATE: 17 BRPM | OXYGEN SATURATION: 92 % | BODY MASS INDEX: 24.94 KG/M2 | DIASTOLIC BLOOD PRESSURE: 67 MMHG

## 2023-03-30 DIAGNOSIS — I10 HYPERTENSION, UNSPECIFIED TYPE: Primary | ICD-10-CM

## 2023-03-30 DIAGNOSIS — I10 WHITE COAT SYNDROME WITH DIAGNOSIS OF HYPERTENSION: ICD-10-CM

## 2023-03-30 RX ORDER — LOSARTAN POTASSIUM 50 MG/1
50 TABLET ORAL 2 TIMES DAILY
Qty: 180 TABLET | Refills: 0 | Status: SHIPPED | OUTPATIENT
Start: 2023-03-30

## 2023-03-30 RX ORDER — HYDROCHLOROTHIAZIDE 25 MG/1
TABLET ORAL
Qty: 90 TABLET | Refills: 0 | Status: SHIPPED | OUTPATIENT
Start: 2023-03-30

## 2023-03-30 NOTE — PROGRESS NOTES
Andres Mcadams is a 78 y.o. female   Chief Complaint   Patient presents with    Hypertension     ASSESSMENT AND PLAN:    1. Hypertension, unspecified type  Chronic, ongoing. Unable to tolerate 100mg losartan dose due to side effects. Orthostatic evaluation today was negative. Office pressure elevated above home range with noted h/o white coat syndrome. Systolic pressures still above goal; however, would lean toward conservative management given wide pulse pressures with low normal diastolics likely attributed to age related vessel calcification.   - Will continue HCTZ at current dose and Losartan in 50mg divided doses as patient reports has been able to tolerate this without issue. - losartan (COZAAR) 50 mg tablet; Take 1 Tablet by mouth two (2) times a day. Dispense: 180 Tablet; Refill: 0  - hydroCHLOROthiazide (HYDRODIURIL) 25 mg tablet; Take 1 tablet by mouth once daily  Dispense: 90 Tablet; Refill: 0    2. White coat syndrome with diagnosis of hypertension  Elevated in office today though with better BP range noted per home log. Prior diagnosis of white coat noted. - losartan (COZAAR) 50 mg tablet; Take 1 Tablet by mouth two (2) times a day. Dispense: 180 Tablet; Refill: 0  - hydroCHLOROthiazide (HYDRODIURIL) 25 mg tablet; Take 1 tablet by mouth once daily  Dispense: 90 Tablet; Refill: 0      SUBJECTIVE:    HPI:  Clare Pump. Julio Ashton is a 78 y.o. female who presents for BP follow up. #HTN  - currently prescribed losartan 100mg and hctz 25mg   - has been taking hctz 25mg, has been taking losartan 50mg in the morning and 50mg in the evening  - patient reports when she was taking 100mg losartan was starting to have dizziness upon standing and with walking. Since she has started splitting up when she takes the medication   - BP at home ranges 140-150/50s-60s    ROS   General: No fevers. No chills. HENT: No congestion. No rhinorrhea. No sore throat. Eyes: No eye pain. No eye redness.    Respiratory: No cough. No shortness of breath. Cardio: No chest pain. No leg swelling. No palpitations. GI: No abd pain. No n/v. No diarrhea. No constipation. : No frequency. No dysuria. No urgency. MSK: No back pain. No neck pain. Neuro: No headaches. No dizziness. Current Outpatient Medications:     losartan (COZAAR) 50 mg tablet, Take 1 Tablet by mouth two (2) times a day., Disp: 180 Tablet, Rfl: 0    hydroCHLOROthiazide (HYDRODIURIL) 25 mg tablet, Take 1 tablet by mouth once daily, Disp: 90 Tablet, Rfl: 0    olopatadine (PATADAY) 0.2 % drop ophthalmic solution, Administer 1 Drop to both eyes daily.  (Patient not taking: Reported on 3/30/2023), Disp: , Rfl:     Past Medical History:   Diagnosis Date    High triglycerides     Hypertension        Past Surgical History:   Procedure Laterality Date    HX APPENDECTOMY      HX KNEE REPLACEMENT Bilateral     [id number]    HX TUBAL LIGATION         Social History     Socioeconomic History    Marital status:      Spouse name: Not on file    Number of children: Not on file    Years of education: Not on file    Highest education level: Not on file   Occupational History    Not on file   Tobacco Use    Smoking status: Never    Smokeless tobacco: Never   Vaping Use    Vaping Use: Never used   Substance and Sexual Activity    Alcohol use: Yes    Drug use: No    Sexual activity: Not Currently   Other Topics Concern    Not on file   Social History Narrative    Not on file     Social Determinants of Health     Financial Resource Strain: Not on file   Food Insecurity: Not on file   Transportation Needs: Not on file   Physical Activity: Not on file   Stress: Not on file   Social Connections: Not on file   Intimate Partner Violence: Not on file   Housing Stability: Not on file       OBJECTIVE:  BP (!) 174/67   Pulse 64   Temp 97.5 °F (36.4 °C) (Temporal)   Resp 17   Ht 5' (1.524 m)   Wt 127 lb (57.6 kg)   SpO2 92%   BMI 24.80 kg/m²     Physical Exam  Vitals and nursing note reviewed. HENT:      Head: Normocephalic and atraumatic. Mouth/Throat:      Mouth: Mucous membranes are moist.      Pharynx: Oropharynx is clear. Eyes:      Conjunctiva/sclera: Conjunctivae normal.      Pupils: Pupils are equal, round, and reactive to light. Cardiovascular:      Rate and Rhythm: Normal rate and regular rhythm. Heart sounds: No murmur heard. No friction rub. No gallop. Pulmonary:      Effort: Pulmonary effort is normal. No respiratory distress. Breath sounds: Normal breath sounds. No wheezing, rhonchi or rales. Skin:     General: Skin is warm and dry. Capillary Refill: Capillary refill takes less than 2 seconds. Neurological:      General: No focal deficit present. Mental Status: She is alert.

## 2023-03-30 NOTE — PROGRESS NOTES
Chief Complaint   Patient presents with    Hypertension     1. Have you been to the ER, urgent care clinic since your last visit? Hospitalized since your last visit? No    2. Have you seen or consulted any other health care providers outside of the [address] [place] [address] since your last visit? Include any pap smears or colon screening.  No

## 2023-05-30 ENCOUNTER — TELEPHONE (OUTPATIENT)
Age: 83
End: 2023-05-30

## 2023-05-30 NOTE — TELEPHONE ENCOUNTER
Patient is almost out of medication. She is looking for a refill on:    HCTZ 25mg 1 tab daily. [address] [facility]. Thanks!

## 2023-06-04 RX ORDER — HYDROCHLOROTHIAZIDE 25 MG/1
25 TABLET ORAL DAILY
Qty: 90 TABLET | Refills: 3 | Status: SHIPPED | OUTPATIENT
Start: 2023-06-04

## 2023-08-19 RX ORDER — LOSARTAN POTASSIUM 50 MG/1
TABLET ORAL
Qty: 180 TABLET | Refills: 0 | Status: SHIPPED | OUTPATIENT
Start: 2023-08-19

## 2023-09-11 ENCOUNTER — OFFICE VISIT (OUTPATIENT)
Age: 83
End: 2023-09-11
Payer: MEDICARE

## 2023-09-11 VITALS
HEIGHT: 60 IN | TEMPERATURE: 97 F | OXYGEN SATURATION: 98 % | DIASTOLIC BLOOD PRESSURE: 69 MMHG | SYSTOLIC BLOOD PRESSURE: 164 MMHG | RESPIRATION RATE: 18 BRPM | HEART RATE: 64 BPM | BODY MASS INDEX: 24.94 KG/M2 | WEIGHT: 127 LBS

## 2023-09-11 DIAGNOSIS — M25.552 PAIN OF LEFT HIP: ICD-10-CM

## 2023-09-11 DIAGNOSIS — M25.562 ACUTE PAIN OF LEFT KNEE: ICD-10-CM

## 2023-09-11 DIAGNOSIS — R73.03 PREDIABETES: ICD-10-CM
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
## 2023-09-11 DIAGNOSIS — G62.9 PERIPHERAL POLYNEUROPATHY: ICD-10-CM

## 2023-09-11 PROCEDURE — 99214 OFFICE O/P EST MOD 30 MIN: CPT | Performed by: STUDENT IN AN ORGANIZED HEALTH CARE EDUCATION/TRAINING PROGRAM

## 2023-09-11 RX ORDER — ACETAMINOPHEN 500 MG
500 TABLET ORAL EVERY 6 HOURS PRN
COMMUNITY

## 2023-09-11 SDOH — ECONOMIC STABILITY: FOOD INSECURITY: WITHIN THE PAST 12 MONTHS, THE FOOD YOU BOUGHT JUST DIDN'T LAST AND YOU DIDN'T HAVE MONEY TO GET MORE.: NEVER TRUE

## 2023-09-11 SDOH — ECONOMIC STABILITY: HOUSING INSECURITY
IN THE LAST 12 MONTHS, WAS THERE A TIME WHEN YOU DID NOT HAVE A STEADY PLACE TO SLEEP OR SLEPT IN A SHELTER (INCLUDING NOW)?: NO

## 2023-09-11 SDOH — ECONOMIC STABILITY: INCOME INSECURITY: HOW HARD IS IT FOR YOU TO PAY FOR THE VERY BASICS LIKE FOOD, HOUSING, MEDICAL CARE, AND HEATING?: NOT HARD AT ALL

## 2023-09-11 SDOH — ECONOMIC STABILITY: FOOD INSECURITY: WITHIN THE PAST 12 MONTHS, YOU WORRIED THAT YOUR FOOD WOULD RUN OUT BEFORE YOU GOT MONEY TO BUY MORE.: NEVER TRUE

## 2023-09-11 ASSESSMENT — PATIENT HEALTH QUESTIONNAIRE - PHQ9
SUM OF ALL RESPONSES TO PHQ QUESTIONS 1-9: 0
SUM OF ALL RESPONSES TO PHQ9 QUESTIONS 1 & 2: 0
SUM OF ALL RESPONSES TO PHQ QUESTIONS 1-9: 0
2. FEELING DOWN, DEPRESSED OR HOPELESS: 0
1. LITTLE INTEREST OR PLEASURE IN DOING THINGS: 0
SUM OF ALL RESPONSES TO PHQ QUESTIONS 1-9: 0
SUM OF ALL RESPONSES TO PHQ QUESTIONS 1-9: 0

## 2023-09-11 NOTE — PATIENT INSTRUCTIONS
Please call to schedule your appointment with Dr. Trena Cosby at [phone] at [address] [facility] then call our office back @ [phone] to leave a message for our referral coordinator with the appointment information (date/time/providers full name) for whom you will be seeing for this referral order so that we can authorize your visit(s) with your insurance if needed and referral tracking purposes of this order.

## 2023-09-12 ENCOUNTER — TELEPHONE (OUTPATIENT)
Age: 83
End: 2023-09-12

## 2023-09-12 LAB
CRP SERPL-MCNC: <0.29 MG/DL (ref 0–0.6)
ERYTHROCYTE [SEDIMENTATION RATE] IN BLOOD: 8 MM/HR (ref 0–30)
EST. AVERAGE GLUCOSE BLD GHB EST-MCNC: 131 MG/DL
HBA1C MFR BLD: 6.2 % (ref 4–5.6)
TSH SERPL DL<=0.05 MIU/L-ACNC: 2.1 UIU/ML (ref 0.36–3.74)
VIT B12 SERPL-MCNC: 516 PG/ML (ref 193–986)

## 2023-09-12 NOTE — TELEPHONE ENCOUNTER
Attempted to call patient to discuss lab test results but did not answer. Left VM, requested callback. Labs stable, will recommend preceding with EMG studies.

## 2023-09-14 LAB — ANA SER QL: NEGATIVE

## 2023-11-20 ENCOUNTER — TELEPHONE (OUTPATIENT)
Age: 83
End: 2023-11-20

## 2023-11-20 NOTE — TELEPHONE ENCOUNTER
Patient came in requesting to speak with the . I advised her that she was out of the office today, she asked if she could have the  call once she is back in office.

## 2023-12-31 ENCOUNTER — HOSPITAL ENCOUNTER (EMERGENCY)
Facility: HOSPITAL | Age: 83
Discharge: HOME | End: 2024-01-03
Payer: MEDICARE

## 2023-12-31 ENCOUNTER — APPOINTMENT (OUTPATIENT)
Facility: HOSPITAL | Age: 83
End: 2023-12-31
Payer: MEDICARE

## 2023-12-31 ENCOUNTER — HOSPITAL ENCOUNTER (EMERGENCY)
Facility: HOSPITAL | Age: 83
Discharge: HOME | End: 2023-12-31
Attending: EMERGENCY MEDICINE
Payer: MEDICARE

## 2023-12-31 VITALS
RESPIRATION RATE: 16 BRPM | HEIGHT: 60 IN | HEART RATE: 71 BPM | BODY MASS INDEX: 24.54 KG/M2 | SYSTOLIC BLOOD PRESSURE: 210 MMHG | OXYGEN SATURATION: 96 % | TEMPERATURE: 97.4 F | DIASTOLIC BLOOD PRESSURE: 69 MMHG | WEIGHT: 125 LBS

## 2023-12-31 DIAGNOSIS — N39.0 UTI (URINARY TRACT INFECTION), UNCOMPLICATED: ICD-10-CM

## 2023-12-31 DIAGNOSIS — R03.0 ELEVATED BLOOD PRESSURE READING: Primary | ICD-10-CM

## 2023-12-31 LAB
ALBUMIN SERPL-MCNC: 3.7 G/DL (ref 3.5–5)
ALBUMIN/GLOB SERPL: 0.9 (ref 1.1–2.2)
ALP SERPL-CCNC: 83 U/L (ref 45–117)
ALT SERPL-CCNC: 28 U/L (ref 12–78)
ANION GAP SERPL CALC-SCNC: 5 MMOL/L (ref 5–15)
APPEARANCE UR: CLEAR
AST SERPL-CCNC: 31 U/L (ref 15–37)
BACTERIA URNS QL MICRO: ABNORMAL /HPF
BASOPHILS # BLD: 0.1 K/UL (ref 0–0.1)
BASOPHILS NFR BLD: 1 % (ref 0–1)
BILIRUB SERPL-MCNC: 0.4 MG/DL (ref 0.2–1)
BILIRUB UR QL: NEGATIVE
BUN SERPL-MCNC: 24 MG/DL (ref 6–20)
BUN/CREAT SERPL: 21 (ref 12–20)
CALCIUM SERPL-MCNC: 10.3 MG/DL (ref 8.5–10.1)
CHLORIDE SERPL-SCNC: 109 MMOL/L (ref 97–108)
CO2 SERPL-SCNC: 26 MMOL/L (ref 21–32)
COLOR UR: ABNORMAL
CREAT SERPL-MCNC: 1.14 MG/DL (ref 0.55–1.02)
DIFFERENTIAL METHOD BLD: ABNORMAL
EOSINOPHIL # BLD: 0.2 K/UL (ref 0–0.4)
EOSINOPHIL NFR BLD: 3 % (ref 0–7)
EPITH CASTS URNS QL MICRO: ABNORMAL /LPF
ERYTHROCYTE [DISTWIDTH] IN BLOOD BY AUTOMATED COUNT: 13.4 % (ref 11.5–14.5)
GLOBULIN SER CALC-MCNC: 3.9 G/DL (ref 2–4)
GLUCOSE SERPL-MCNC: 126 MG/DL (ref 65–100)
GLUCOSE UR STRIP.AUTO-MCNC: NEGATIVE MG/DL
HCT VFR BLD AUTO: 43.1 % (ref 35–47)
HGB BLD-MCNC: 14.4 G/DL (ref 11.5–16)
HGB UR QL STRIP: NEGATIVE
HYALINE CASTS URNS QL MICRO: ABNORMAL /LPF (ref 0–2)
IMM GRANULOCYTES # BLD AUTO: 0 K/UL (ref 0–0.04)
IMM GRANULOCYTES NFR BLD AUTO: 0 % (ref 0–0.5)
KETONES UR QL STRIP.AUTO: NEGATIVE MG/DL
LEUKOCYTE ESTERASE UR QL STRIP.AUTO: ABNORMAL
LYMPHOCYTES # BLD: 1.8 K/UL (ref 0.8–3.5)
LYMPHOCYTES NFR BLD: 23 % (ref 12–49)
MAGNESIUM SERPL-MCNC: 1.8 MG/DL (ref 1.6–2.4)
MCH RBC QN AUTO: 29.3 PG (ref 26–34)
MCHC RBC AUTO-ENTMCNC: 33.4 G/DL (ref 30–36.5)
MCV RBC AUTO: 87.6 FL (ref 80–99)
MONOCYTES # BLD: 0.6 K/UL (ref 0–1)
MONOCYTES NFR BLD: 8 % (ref 5–13)
NEUTS SEG # BLD: 5.3 K/UL (ref 1.8–8)
NEUTS SEG NFR BLD: 65 % (ref 32–75)
NITRITE UR QL STRIP.AUTO: NEGATIVE
NRBC # BLD: 0 K/UL (ref 0–0.01)
NRBC BLD-RTO: 0 PER 100 WBC
PH UR STRIP: 6 (ref 5–8)
PLATELET # BLD AUTO: 484 K/UL (ref 150–400)
PMV BLD AUTO: 9.6 FL (ref 8.9–12.9)
POTASSIUM SERPL-SCNC: 4.2 MMOL/L (ref 3.5–5.1)
PROT SERPL-MCNC: 7.6 G/DL (ref 6.4–8.2)
PROT UR STRIP-MCNC: NEGATIVE MG/DL
RBC # BLD AUTO: 4.92 M/UL (ref 3.8–5.2)
RBC #/AREA URNS HPF: ABNORMAL /HPF (ref 0–5)
SODIUM SERPL-SCNC: 140 MMOL/L (ref 136–145)
SP GR UR REFRACTOMETRY: 1.01 (ref 1–1.03)
SPECIMEN HOLD: NORMAL
UROBILINOGEN UR QL STRIP.AUTO: 0.2 EU/DL (ref 0.2–1)
WBC # BLD AUTO: 8 K/UL (ref 3.6–11)
WBC URNS QL MICRO: ABNORMAL /HPF (ref 0–4)

## 2023-12-31 PROCEDURE — 6360000004 HC RX CONTRAST MEDICATION: Performed by: EMERGENCY MEDICINE

## 2023-12-31 PROCEDURE — 87077 CULTURE AEROBIC IDENTIFY: CPT

## 2023-12-31 PROCEDURE — 80053 COMPREHEN METABOLIC PANEL: CPT

## 2023-12-31 PROCEDURE — 83735 ASSAY OF MAGNESIUM: CPT

## 2023-12-31 PROCEDURE — 36415 COLL VENOUS BLD VENIPUNCTURE: CPT

## 2023-12-31 PROCEDURE — 87186 SC STD MICRODIL/AGAR DIL: CPT

## 2023-12-31 PROCEDURE — 87086 URINE CULTURE/COLONY COUNT: CPT

## 2023-12-31 PROCEDURE — 70450 CT HEAD/BRAIN W/O DYE: CPT

## 2023-12-31 PROCEDURE — 99284 EMERGENCY DEPT VISIT MOD MDM: CPT

## 2023-12-31 PROCEDURE — 81001 URINALYSIS AUTO W/SCOPE: CPT

## 2023-12-31 PROCEDURE — 70496 CT ANGIOGRAPHY HEAD: CPT

## 2023-12-31 PROCEDURE — 85025 COMPLETE CBC W/AUTO DIFF WBC: CPT

## 2023-12-31 RX ORDER — NITROFURANTOIN 25; 75 MG/1; MG/1
100 CAPSULE ORAL 2 TIMES DAILY
Qty: 28 CAPSULE | Refills: 0 | Status: SHIPPED | OUTPATIENT
Start: 2023-12-31 | End: 2024-01-14

## 2023-12-31 RX ADMIN — IOPAMIDOL 100 ML: 755 INJECTION, SOLUTION INTRAVENOUS at 16:00

## 2023-12-31 ASSESSMENT — PAIN DESCRIPTION - ORIENTATION: ORIENTATION: ANTERIOR

## 2023-12-31 ASSESSMENT — PAIN - FUNCTIONAL ASSESSMENT: PAIN_FUNCTIONAL_ASSESSMENT: 0-10

## 2023-12-31 ASSESSMENT — PAIN DESCRIPTION - DESCRIPTORS: DESCRIPTORS: PRESSURE

## 2023-12-31 ASSESSMENT — PAIN DESCRIPTION - LOCATION: LOCATION: HEAD

## 2023-12-31 ASSESSMENT — PAIN SCALES - GENERAL: PAINLEVEL_OUTOF10: 10

## 2023-12-31 NOTE — ED TRIAGE NOTES
Patient arrives ambulatory with complaints of elevated blood pressure over the past 3 days. Also reports head pressure. Referred by patient first.     Denies numbness, weakness, tingling or blurred vision.

## 2023-12-31 NOTE — ED PROVIDER NOTES
[facility] EMERGENCY DEPT  EMERGENCY DEPARTMENT ENCOUNTER      Pt Name: Cecilia Weinberg  MRN: [id number]  Birthdate 1940  Date of evaluation: 12/31/2023  Provider: CORNELIO Madrid NP    CHIEF COMPLAINT       Chief Complaint   Patient presents with    Hypertension         HISTORY OF PRESENT ILLNESS   (Location/Symptom, Timing/Onset, Context/Setting, Quality, Duration, Modifying Factors, Severity)  Note limiting factors.   HPI  Patient is an 83-year-old elderly female with past medical history significant for hypertension, hyperlipidemia, ataxia, chronic kidney disease, TIA and intermittent dizziness who presents to the ED reporting that her blood pressure has been elevated for 3 consistent readings today.Denies fever, cold symptoms, headache. neck pain, visual changes, focal weakness or rash. Denies any difficulty breathing, difficulty swallowing, SOB or chest pain. Denies any nausea, vomiting, constipation or diarrhea.  Hand eye coordination is intact; normal gait.  She drove herself here.  Bilateral  strength is equal; no facial weakness noted.        Review of External Medical Records:     Nursing Notes were reviewed.    REVIEW OF SYSTEMS    (2-9 systems for level 4, 10 or more for level 5)     Review of Systems   Constitutional:  Negative for activity change, appetite change, fever and unexpected weight change.   HENT:  Negative for congestion and trouble swallowing.    Eyes:  Negative for visual disturbance.   Respiratory:  Negative for cough and shortness of breath.    Cardiovascular:  Negative for chest pain, palpitations and leg swelling.   Gastrointestinal:  Negative for abdominal pain.   Genitourinary:  Negative for dysuria.   Musculoskeletal:  Negative for gait problem.   Neurological:  Negative for dizziness, light-headedness and headaches.   All other systems reviewed and are negative.      Except as noted above the remainder of the review of systems was reviewed and negative.       PAST

## 2024-01-03 LAB
BACTERIA SPEC CULT: ABNORMAL
CC UR VC: ABNORMAL
SERVICE CMNT-IMP: ABNORMAL

## 2024-01-03 RX ORDER — LOSARTAN POTASSIUM 50 MG/1
TABLET ORAL
Qty: 180 TABLET | Refills: 0 | Status: SHIPPED | OUTPATIENT
Start: 2024-01-03

## 2024-01-08 ENCOUNTER — NURSE TRIAGE (OUTPATIENT)
Dept: OTHER | Facility: CLINIC | Age: 84
End: 2024-01-08

## 2024-01-08 NOTE — TELEPHONE ENCOUNTER
[facility]    Received call from Mel at [facility] with Red Flag Complaint.    Subjective: Caller states \"I have high blood pressure. I had it a long time. A week ago I noticed it was going  up. , 179, 147, 181, 173. I went to [place] for [place] and I felt great. I came home and I was worn out. I am not full of my normal energy. My nerves are shot. I have not slept a full night for 3 years. I worry about my family.\"     Current Symptoms: fatigue.146/68, pulse 67 this morning at 0836. No CP or SOB.     Onset: 1 week ago; unchanged    Associated Symptoms: Pins/needles in feet. States has mentioned that to PCP prior with neurologist referral recommendation.     Pain Severity: 0/10; ;     Temperature: denies     What has been tried: naps    LMP: NA Pregnant: NA    Recommended disposition: Go to [facility]    Care advice provided, patient verbalizes understanding; denies any other questions or concerns; instructed to call back for any new or worsening symptoms.    Patient/Caller agrees with recommended disposition; writer provided warm transfer to [place] at [facility] for appointment scheduling    Attention Provider:  Thank you for allowing me to participate in the care of your patient.  The patient was connected to triage in response to information provided to the [facility].  Please do not respond through this encounter as the response is not directed to a shared pool.        Reason for Disposition   MODERATE weakness (i.e., interferes with work, school, normal activities) and cause unknown (Exceptions: Weakness from acute minor illness or from poor fluid intake; weakness is chronic and not worse.)    Protocols used: Weakness (Generalized) and Fatigue-ADULT-[place]

## 2024-01-25 ENCOUNTER — OFFICE VISIT (OUTPATIENT)
Age: 84
End: 2024-01-25
Payer: MEDICARE

## 2024-01-25 VITALS
HEIGHT: 60 IN | TEMPERATURE: 98 F | OXYGEN SATURATION: 98 % | SYSTOLIC BLOOD PRESSURE: 142 MMHG | RESPIRATION RATE: 16 BRPM | HEART RATE: 62 BPM | WEIGHT: 128 LBS | DIASTOLIC BLOOD PRESSURE: 70 MMHG | BODY MASS INDEX: 25.13 KG/M2

## 2024-01-25 DIAGNOSIS — R45.89 DEPRESSED MOOD: ICD-10-CM

## 2024-01-25 DIAGNOSIS — I10 ESSENTIAL HYPERTENSION: Primary | ICD-10-CM

## 2024-01-25 LAB
BILIRUBIN, URINE, POC: NEGATIVE
BLOOD URINE, POC: NEGATIVE
GLUCOSE URINE, POC: NEGATIVE
KETONES, URINE, POC: NEGATIVE
LEUKOCYTE ESTERASE, URINE, POC: NEGATIVE
NITRITE, URINE, POC: NEGATIVE
PH, URINE, POC: 7 (ref 4.6–8)
PROTEIN,URINE, POC: NEGATIVE
SPECIFIC GRAVITY, URINE, POC: 1.01 (ref 1–1.03)
URINALYSIS CLARITY, POC: CLEAR
URINALYSIS COLOR, POC: YELLOW
UROBILINOGEN, POC: NORMAL

## 2024-01-25 PROCEDURE — 99214 OFFICE O/P EST MOD 30 MIN: CPT | Performed by: STUDENT IN AN ORGANIZED HEALTH CARE EDUCATION/TRAINING PROGRAM

## 2024-01-25 PROCEDURE — 81003 URINALYSIS AUTO W/O SCOPE: CPT | Performed by: STUDENT IN AN ORGANIZED HEALTH CARE EDUCATION/TRAINING PROGRAM

## 2024-01-25 RX ORDER — AMLODIPINE BESYLATE 2.5 MG/1
2.5 TABLET ORAL DAILY
Qty: 30 TABLET | Refills: 0 | Status: SHIPPED | OUTPATIENT
Start: 2024-01-25

## 2024-01-25 ASSESSMENT — PATIENT HEALTH QUESTIONNAIRE - PHQ9
SUM OF ALL RESPONSES TO PHQ QUESTIONS 1-9: 0
SUM OF ALL RESPONSES TO PHQ QUESTIONS 1-9: 0
1. LITTLE INTEREST OR PLEASURE IN DOING THINGS: 0
SUM OF ALL RESPONSES TO PHQ QUESTIONS 1-9: 0
2. FEELING DOWN, DEPRESSED OR HOPELESS: 0
SUM OF ALL RESPONSES TO PHQ QUESTIONS 1-9: 0
SUM OF ALL RESPONSES TO PHQ9 QUESTIONS 1 & 2: 0

## 2024-01-25 NOTE — PROGRESS NOTES
CC: ER follow up   Subjective   Cecilia Weinberg is an 83 y.o. female who presents for ER follow up. Was seen for elevated blood pressure. Had some associated headache at that time. Had recently returned home from [place] and was significantly stressed due to travel. Notes symptoms have resolved. No numbness/tingling, weakness, vision changes.   BP at home since that time ranging 130s-170s/60s-80s    Was also diagnosed with UTI at time of ER visit, treated with macrobid. Reports had not been hydrating well. Has had no further urinary symptoms.     Today, notes that she is concerned about difficulty sleeping. Often thinks about the loss of her close friends as well as her previous marriage which she reports was a difficult time for her. Also notes that she had some trouble with identity theft in the last few months, which she is working on with her . Was able to travel to [place] over the holidays to spend time with family which she enjoyed.     ROS - negative except as noted in HPI    Allergies   Allergies   Allergen Reactions    Amlodipine Other (See Comments)     Caused her teeth to rot    Ampicillin Dermatitis    Lisinopril      Other reaction(s): Unknown (comments)  Mind confusion       Medications  Current Outpatient Medications   Medication Sig    amLODIPine (NORVASC) 2.5 MG tablet Take 1 tablet by mouth daily    losartan (COZAAR) 50 MG tablet Take 1 tablet by mouth twice daily    VITAMIN D PO Take by mouth    acetaminophen (TYLENOL) 500 MG tablet Take 1 tablet by mouth every 6 hours as needed for Pain    hydroCHLOROthiazide (HYDRODIURIL) 25 MG tablet Take 1 tablet by mouth daily    losartan (COZAAR) 100 MG tablet Take by mouth daily (Patient not taking: Reported on 1/25/2024)    olopatadine (PATADAY) 0.2 % SOLN ophthalmic solution Apply 1 drop to eye daily (Patient not taking: Reported on 1/25/2024)     No current facility-administered medications for this visit.       Medical History  Past Medical

## 2024-01-25 NOTE — PROGRESS NOTES
Identified pt with two pt identifiers(name and ). Reviewed record in preparation for visit and have obtained necessary documentation.  Chief Complaint   Patient presents with    Follow-Up from Hospital        Health Maintenance Due   Topic    Shingles vaccine (1 of 2)    DEXA (modify frequency per FRAX score)     Respiratory Syncytial Virus (RSV) Pregnant or age 60 yrs+ (1 - 1-dose 60+ series)    Pneumococcal 65+ years Vaccine (1 - PCV)    Flu vaccine (1)    COVID-19 Vaccine (2023- season)    Annual Wellness Visit (Medicare Advantage)        Vitals:    24 1052   BP: (!) 140/80   Site: Left Upper Arm   Position: Sitting   Cuff Size: Small Adult   Pulse: 62   Resp: 16   Temp: 98 °F (36.7 °C)   TempSrc: Temporal   SpO2: 98%   Weight: 58.1 kg (128 lb)   Height: 1.524 m (5')           Coordination of Care Questionnaire:  :   1. Have you been to the ER, urgent care clinic since your last visit?  Hospitalized since your last visit?Yes When: 2023 Where: [facility] Reason for visit: UTI / HTN    2. Have you seen or consulted any other health care providers outside of the [facility] since your last visit?  Include any pap smears or colon screening. No    This patient is accompanied in the office by her self.  I have received verbal consent from Cecilia Weinberg to discuss any/all medical information while they are present in the room.

## 2024-01-26 LAB
25(OH)D3 SERPL-MCNC: 80.8 NG/ML (ref 30–100)
ANION GAP SERPL CALC-SCNC: 5 MMOL/L (ref 5–15)
BUN SERPL-MCNC: 29 MG/DL (ref 6–20)
BUN/CREAT SERPL: 29 (ref 12–20)
CALCIUM SERPL-MCNC: 10.9 MG/DL (ref 8.5–10.1)
CHLORIDE SERPL-SCNC: 106 MMOL/L (ref 97–108)
CO2 SERPL-SCNC: 28 MMOL/L (ref 21–32)
CREAT SERPL-MCNC: 1.01 MG/DL (ref 0.55–1.02)
FOLATE SERPL-MCNC: 17.6 NG/ML (ref 5–21)
GLUCOSE SERPL-MCNC: 108 MG/DL (ref 65–100)
POTASSIUM SERPL-SCNC: 4.8 MMOL/L (ref 3.5–5.1)
SODIUM SERPL-SCNC: 139 MMOL/L (ref 136–145)
T4 FREE SERPL-MCNC: 1.1 NG/DL (ref 0.8–1.5)
TSH SERPL DL<=0.05 MIU/L-ACNC: 1.45 UIU/ML (ref 0.36–3.74)
VIT B12 SERPL-MCNC: 440 PG/ML (ref 193–986)

## 2024-02-13 ENCOUNTER — OFFICE VISIT (OUTPATIENT)
Age: 84
End: 2024-02-13
Payer: MEDICARE

## 2024-02-13 VITALS
RESPIRATION RATE: 18 BRPM | HEART RATE: 63 BPM | BODY MASS INDEX: 24.54 KG/M2 | HEIGHT: 60 IN | WEIGHT: 125 LBS | DIASTOLIC BLOOD PRESSURE: 80 MMHG | TEMPERATURE: 97.8 F | SYSTOLIC BLOOD PRESSURE: 138 MMHG | OXYGEN SATURATION: 98 %

## 2024-02-13 DIAGNOSIS — I10 ESSENTIAL HYPERTENSION: Primary | ICD-10-CM

## 2024-02-13 DIAGNOSIS — N18.31 STAGE 3A CHRONIC KIDNEY DISEASE (HCC): ICD-10-CM

## 2024-02-13 DIAGNOSIS — E83.52 HYPERCALCEMIA: ICD-10-CM

## 2024-02-13 PROCEDURE — 3075F SYST BP GE 130 - 139MM HG: CPT | Performed by: STUDENT IN AN ORGANIZED HEALTH CARE EDUCATION/TRAINING PROGRAM

## 2024-02-13 PROCEDURE — 99214 OFFICE O/P EST MOD 30 MIN: CPT | Performed by: STUDENT IN AN ORGANIZED HEALTH CARE EDUCATION/TRAINING PROGRAM

## 2024-02-13 PROCEDURE — 3079F DIAST BP 80-89 MM HG: CPT | Performed by: STUDENT IN AN ORGANIZED HEALTH CARE EDUCATION/TRAINING PROGRAM

## 2024-02-13 PROCEDURE — 1123F ACP DISCUSS/DSCN MKR DOCD: CPT | Performed by: STUDENT IN AN ORGANIZED HEALTH CARE EDUCATION/TRAINING PROGRAM

## 2024-02-13 RX ORDER — AMLODIPINE BESYLATE 5 MG/1
5 TABLET ORAL DAILY
Qty: 90 TABLET | Refills: 0 | Status: SHIPPED | OUTPATIENT
Start: 2024-02-13

## 2024-02-13 NOTE — PROGRESS NOTES
Identified pt with two pt identifiers(name and ). Reviewed record in preparation for visit and have obtained necessary documentation.  Chief Complaint   Patient presents with    Hypertension     -  Wants referral to Neuropathy bilateral lower extremities and feet  / severe cramping     Health Maintenance Due   Topic    Shingles vaccine (1 of 2)    DEXA (modify frequency per FRAX score)     Respiratory Syncytial Virus (RSV) Pregnant or age 60 yrs+ (1 - 1-dose 60+ series)    Pneumococcal 65+ years Vaccine (1 - PCV)    Flu vaccine (1)    COVID-19 Vaccine ( -  season)    Annual Wellness Visit (Medicare Advantage)        Vitals:    24 1146   BP: 138/80   Site: Left Upper Arm   Position: Sitting   Cuff Size: Small Adult   Pulse: 63   Resp: 18   Temp: 97.8 °F (36.6 °C)   TempSrc: Temporal   SpO2: 98%   Weight: 56.7 kg (125 lb)   Height: 1.524 m (5')           Coordination of Care Questionnaire:  :   1. Have you been to the ER, urgent care clinic since your last visit?  Hospitalized since your last visit?No    2. Have you seen or consulted any other health care providers outside of the [facility] since your last visit?  Include any pap smears or colon screening. No    This patient is accompanied in the office by her self.  I have received verbal consent from Cecilia Weinberg to discuss any/all medical information while they are present in the room.

## 2024-02-13 NOTE — PROGRESS NOTES
CC: BP follow up   Subjective   Cecilia Weinberg is an 83 y.o. female who presents for BP follow up     HTN  SBP ranging 130-150/60-80 at home   On losartan, HCTZ + amlodipine 2.5mg added at last visit which appears to be helping   Tolerating medications well, denies side effects     Inquired about depressed mood noted at last visit. Patient reports that she is feeling better since the last visit. Daughter recently visited with her which is helpful. 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 Geno is a source of strength/support for her.     ROS - negative except as noted in HPI    Allergies   Allergies   Allergen Reactions    Amlodipine Other (See Comments)     Caused her teeth to rot    Ampicillin Dermatitis    Lisinopril      Other reaction(s): Unknown (comments)  Mind confusion     Medications  Current Outpatient Medications   Medication Sig    amLODIPine (NORVASC) 2.5 MG tablet Take 1 tablet by mouth daily    losartan (COZAAR) 50 MG tablet Take 1 tablet by mouth twice daily    VITAMIN D PO Take by mouth    acetaminophen (TYLENOL) 500 MG tablet Take 1 tablet by mouth every 6 hours as needed for Pain    hydroCHLOROthiazide (HYDRODIURIL) 25 MG tablet Take 1 tablet by mouth daily    olopatadine (PATADAY) 0.2 % SOLN ophthalmic solution Apply 1 drop to eye daily    losartan (COZAAR) 100 MG tablet Take by mouth daily (Patient not taking: Reported on 2/13/2024)     No current facility-administered medications for this visit.     Medical History  Past Medical History:   Diagnosis Date    High triglycerides     Hypertension      Immunizations   Immunization History   Administered Date(s) Administered    Rabies IM Fibroblast Culture ([facility]) 02/16/2018, 02/19/2018, 02/23/2018, 03/02/2018    Rabies Immune Globulin 02/16/2018    TDaP, ADACEL (age 10y-64y), BOOSTRIX

## 2024-02-14 LAB
CALCIUM SERPL-MCNC: 10.8 MG/DL (ref 8.5–10.1)
PTH-INTACT SERPL-MCNC: 17.1 PG/ML (ref 18.4–88)

## 2024-02-16 LAB — CA-I SERPL ISE-MCNC: 5.5 MG/DL (ref 4.5–5.6)

## 2024-02-19 ENCOUNTER — TELEPHONE (OUTPATIENT)
Age: 84
End: 2024-02-19

## 2024-02-19 NOTE — TELEPHONE ENCOUNTER
Elsy from [facility] stated that pt dropped off the order that you provided; however, Dr. Fly James only does EMG-Nerve testing. She stated that there is another dr that is there that does the testing you requested; however, she need clarity on the testing that is to be performed. She has requested that you contact her on her direct line, [phone].    Than you

## 2024-02-20 DIAGNOSIS — R20.8 DECREASED SENSATION OF LOWER EXTREMITY: Primary | ICD-10-CM

## 2024-03-15 ENCOUNTER — OFFICE VISIT (OUTPATIENT)
Age: 84
End: 2024-03-15
Payer: MEDICARE

## 2024-03-15 VITALS
DIASTOLIC BLOOD PRESSURE: 70 MMHG | BODY MASS INDEX: 24.35 KG/M2 | OXYGEN SATURATION: 98 % | WEIGHT: 124 LBS | TEMPERATURE: 98 F | RESPIRATION RATE: 18 BRPM | HEIGHT: 60 IN | HEART RATE: 62 BPM | SYSTOLIC BLOOD PRESSURE: 136 MMHG

## 2024-03-15 DIAGNOSIS — R20.2 BILATERAL LEG PARESTHESIA: ICD-10-CM

## 2024-03-15 DIAGNOSIS — I10 ESSENTIAL HYPERTENSION: Primary | ICD-10-CM

## 2024-03-15 PROCEDURE — 99213 OFFICE O/P EST LOW 20 MIN: CPT | Performed by: STUDENT IN AN ORGANIZED HEALTH CARE EDUCATION/TRAINING PROGRAM

## 2024-03-15 ASSESSMENT — PATIENT HEALTH QUESTIONNAIRE - PHQ9
SUM OF ALL RESPONSES TO PHQ QUESTIONS 1-9: 2
SUM OF ALL RESPONSES TO PHQ QUESTIONS 1-9: 2
SUM OF ALL RESPONSES TO PHQ9 QUESTIONS 1 & 2: 2
SUM OF ALL RESPONSES TO PHQ QUESTIONS 1-9: 2
1. LITTLE INTEREST OR PLEASURE IN DOING THINGS: 1
2. FEELING DOWN, DEPRESSED OR HOPELESS: 1
SUM OF ALL RESPONSES TO PHQ QUESTIONS 1-9: 2

## 2024-03-15 NOTE — PROGRESS NOTES
Identified pt with two pt identifiers(name and ). Reviewed record in preparation for visit and have obtained necessary documentation.  Chief Complaint   Patient presents with    Hypertension        Health Maintenance Due   Topic    DEXA (modify frequency per FRAX score)     Respiratory Syncytial Virus (RSV) Pregnant or age 60 yrs+ (1 - 1-dose 60+ series)    Pneumococcal 65+ years Vaccine (1 - PCV)    Shingles vaccine (2 of 3)    Flu vaccine (1)    COVID-19 Vaccine ( -  season)    Annual Wellness Visit (Medicare Advantage)        Vitals:    03/15/24 1029   BP: 136/70   Site: Right Upper Arm   Position: Sitting   Cuff Size: Small Adult   Pulse: 62   Resp: 18   Temp: 98 °F (36.7 °C)   TempSrc: Temporal   SpO2: 98%   Weight: 56.2 kg (124 lb)   Height: 1.524 m (5')           Coordination of Care Questionnaire:  :   1. Have you been to the ER, urgent care clinic since your last visit?  Hospitalized since your last visit?No    2. Have you seen or consulted any other health care providers outside of the [facility] since your last visit?  Include any pap smears or colon screening. [facility] - 2024 , was no impressed with care     This patient is accompanied in the office by her self.  I have received verbal consent from Cecilia Weinberg to discuss any/all medical information while they are present in the room.  
Fibroblast Culture (Rabavert) 02/16/2018, 02/19/2018, 02/23/2018, 03/02/2018    Rabies Immune Globulin 02/16/2018    TDaP, ADACEL (age 10y-64y), BOOSTRIX (age 10y+), IM, 0.5mL 02/15/2018       Objective   Vital Signs  /70 (Site: Right Upper Arm, Position: Sitting, Cuff Size: Small Adult)   Pulse 62   Temp 98 °F (36.7 °C) (Temporal)   Resp 18   Ht 1.524 m (5')   Wt 56.2 kg (124 lb)   SpO2 98%   BMI 24.22 kg/m²     Physical Exam  Vitals and nursing note reviewed.   HENT:      Head: Normocephalic and atraumatic.      Mouth/Throat:      Mouth: Mucous membranes are moist.      Pharynx: Oropharynx is clear.   Eyes:      Conjunctiva/sclera: Conjunctivae normal.      Pupils: Pupils are equal, round, and reactive to light.   Cardiovascular:      Rate and Rhythm: Normal rate and regular rhythm.      Heart sounds: No murmur heard.    No friction rub. No gallop.   Pulmonary:      Effort: Pulmonary effort is normal. No respiratory distress.      Breath sounds: Normal breath sounds. No wheezing, rhonchi or rales.   Skin:     General: Skin is warm and dry.      Capillary Refill: Capillary refill takes less than 2 seconds.   Neurological:      General: No focal deficit present.      Mental Status: She is alert.       Assessment and Plan   Cecilia Weinberg is a 83 y.o. who presents for chronic condition follow up. Isolated systolic hypertension has been somewhat difficult to control but appears stable, somewhat improved with prior amlodipine adjustment. Will continue with current regimen to prevent hypotensive diastolic pressures. Uncertain etiology of bilateral lower extremity paresthesias in light of normal laboratory evaluation. Given associated balance concerns and arthritic pain, discussed possible utility of PT which patient is amenable to.      Diagnosis Orders   1. Essential hypertension        2. Bilateral leg paresthesia          Continue current BP regimen   Continue nightly foot soaks prn   Trial PT -

## 2024-04-05 NOTE — TELEPHONE ENCOUNTER
Medication Refill Request    Cecilia LEORA Weinberg is requesting a refill of the following medication(s):   Requested Prescriptions     Pending Prescriptions Disp Refills    losartan (COZAAR) 50 MG tablet [Pharmacy Med Name: Losartan Potassium 50 MG Oral Tablet] 180 tablet 0     Sig: Take 1 tablet by mouth twice daily        Listed PCP is Perla Sanchez MD     Date of Last Office Visit at [facility] : 03/15/2024 with Dr Sanchez   FUTURE APPOINTMENT: No future appointments.    Please send refill to:    [facility] [address] [place] [address] [facility] [phone] - F [phone]  [address] [facility] [address]  Phone: [phone] Fax: [phone]      Please review request and approve or deny with recommendations.

## 2024-04-11 ENCOUNTER — TELEPHONE (OUTPATIENT)
Age: 84
End: 2024-04-11

## 2024-04-13 RX ORDER — LOSARTAN POTASSIUM 50 MG/1
TABLET ORAL
Qty: 180 TABLET | Refills: 0 | Status: SHIPPED | OUTPATIENT
Start: 2024-04-13

## 2024-04-17 ENCOUNTER — TELEPHONE (OUTPATIENT)
Dept: PHARMACY | Facility: CLINIC | Age: 84
End: 2024-04-17

## 2024-04-17 NOTE — TELEPHONE ENCOUNTER
[facility] CLINICAL PHARMACY: ADHERENCE REVIEW  Identified care gap per [place] fills with [facility]: ACE/ARB adherence    Medicare Advantage - MRMA  Per insurer report, LIS-0 - co-pays are based on tiers and patient is subject to coverage gap.    ASSESSMENT    ACE/ARB ADHERENCE    Insurance Records claims through  24  (Prior Year PDC = 85% - PASSED ; YTD PDC = FIRST FILL; Potential Fail Date: 24):   LOSARTAN POT TAB 50 MG last filled on 24 for 90 day supply. Next refill due: 24    Prescribed si tablet/capsule twice daily    Per Reconcile Dispense History and Insurer Portal: last filled on 24 for 90 day supply.     Per [facility]: last filled on 24 for 90 day supply. Billed through [place]. 0 refills remaining. Pt has appt 24.    BP Readings from Last 3 Encounters:   03/15/24 136/70   24 138/80   24 (!) 142/70     Estimated Creatinine Clearance: 32 mL/min (based on SCr of 1.01 mg/dL).  Lab Results   Component Value Date    CREATININE 1.01 2024     Lab Results   Component Value Date    K 4.8 2024       PLAN  The following are interventions that have been identified:   Pt is adherent filling LOSARTAN POT TAB 50 MG (filled 90 ds on 24). No refills remain but pt has appt 24.    Outreach:  No outreach planned at this time    Last Visit: 03.15.24  Next Visit: 24    Makayla Luna CPhT  Population Health    [facility]   [phone] option 2      For Pharmacy Admin Tracking Only    Program: [employer]  CPA in place:  No  Gap Closed?: Yes   Time Spent (min): 20

## 2024-05-06 ENCOUNTER — TELEPHONE (OUTPATIENT)
Age: 84
End: 2024-05-06

## 2024-05-06 DIAGNOSIS — I10 ESSENTIAL HYPERTENSION: ICD-10-CM

## 2024-05-06 RX ORDER — AMLODIPINE BESYLATE 5 MG/1
5 TABLET ORAL DAILY
Qty: 90 TABLET | Refills: 0 | Status: SHIPPED | OUTPATIENT
Start: 2024-05-06

## 2024-05-06 RX ORDER — AMLODIPINE BESYLATE 5 MG/1
5 TABLET ORAL DAILY
Qty: 90 TABLET | Refills: 0 | OUTPATIENT
Start: 2024-05-06

## 2024-05-06 RX ORDER — HYDROCHLOROTHIAZIDE 25 MG/1
25 TABLET ORAL DAILY
Qty: 90 TABLET | Refills: 3 | Status: SHIPPED | OUTPATIENT
Start: 2024-05-06 | End: 2024-06-21 | Stop reason: SDUPTHER

## 2024-05-06 NOTE — TELEPHONE ENCOUNTER
Medication Refill Request    Cecilia Weinberg is requesting a refill of the following medication(s):   Requested Prescriptions     Pending Prescriptions Disp Refills    hydroCHLOROthiazide (HYDRODIURIL) 25 MG tablet 90 tablet 3     Sig: Take 1 tablet by mouth daily    amLODIPine (NORVASC) 5 MG tablet 90 tablet 0     Sig: Take 1 tablet by mouth daily      Patient stopped by to request that all medications be sent to [facility]. Dr. Sanchez, the pharmacy has been updated in her chart. She has about about 1 week's worth left. Please send in the refill soon. Thanks!    Listed PCP is Perla Sanchez MD   Last provider to prescribe medication: Dr. Sanchez  Last Date of Medication Prescribed: 2/13/24   Date of Last Office Visit at [facility]: 3/15/24   FUTURE APPOINTMENT:   Future Appointments   Date Time Provider Department Center   6/21/2024  1:20 PM Perla Sanchez MD [facility] BS AMB       Please send refill to:    [facility] - [place], [place] - [address] [phone] - F [phone]  [address] [place] [address]  Phone: [phone] Fax: [phone]      Please review request and approve or deny with recommendations.

## 2024-06-21 ENCOUNTER — OFFICE VISIT (OUTPATIENT)
Age: 84
End: 2024-06-21
Payer: MEDICARE

## 2024-06-21 VITALS
TEMPERATURE: 97.9 F | HEIGHT: 60 IN | OXYGEN SATURATION: 96 % | DIASTOLIC BLOOD PRESSURE: 67 MMHG | WEIGHT: 122 LBS | BODY MASS INDEX: 23.95 KG/M2 | RESPIRATION RATE: 18 BRPM | SYSTOLIC BLOOD PRESSURE: 122 MMHG | HEART RATE: 70 BPM

## 2024-06-21 DIAGNOSIS — I10 HYPERTENSION, ISOLATED SYSTOLIC: Primary | ICD-10-CM

## 2024-06-21 PROCEDURE — 3078F DIAST BP <80 MM HG: CPT | Performed by: STUDENT IN AN ORGANIZED HEALTH CARE EDUCATION/TRAINING PROGRAM

## 2024-06-21 PROCEDURE — 3074F SYST BP LT 130 MM HG: CPT | Performed by: STUDENT IN AN ORGANIZED HEALTH CARE EDUCATION/TRAINING PROGRAM

## 2024-06-21 PROCEDURE — 99213 OFFICE O/P EST LOW 20 MIN: CPT | Performed by: STUDENT IN AN ORGANIZED HEALTH CARE EDUCATION/TRAINING PROGRAM

## 2024-06-21 PROCEDURE — 1123F ACP DISCUSS/DSCN MKR DOCD: CPT | Performed by: STUDENT IN AN ORGANIZED HEALTH CARE EDUCATION/TRAINING PROGRAM

## 2024-06-21 RX ORDER — AMLODIPINE BESYLATE 5 MG/1
5 TABLET ORAL DAILY
Qty: 90 TABLET | Refills: 3 | Status: SHIPPED | OUTPATIENT
Start: 2024-06-21

## 2024-06-21 RX ORDER — HYDROCHLOROTHIAZIDE 25 MG/1
25 TABLET ORAL DAILY
Qty: 90 TABLET | Refills: 3 | Status: SHIPPED | OUTPATIENT
Start: 2024-06-21

## 2024-06-21 ASSESSMENT — PATIENT HEALTH QUESTIONNAIRE - PHQ9
SUM OF ALL RESPONSES TO PHQ QUESTIONS 1-9: 0
2. FEELING DOWN, DEPRESSED OR HOPELESS: NOT AT ALL
SUM OF ALL RESPONSES TO PHQ QUESTIONS 1-9: 0
1. LITTLE INTEREST OR PLEASURE IN DOING THINGS: NOT AT ALL
SUM OF ALL RESPONSES TO PHQ9 QUESTIONS 1 & 2: 0
SUM OF ALL RESPONSES TO PHQ QUESTIONS 1-9: 0
SUM OF ALL RESPONSES TO PHQ QUESTIONS 1-9: 0

## 2024-06-21 NOTE — PROGRESS NOTES
ROOM # 3    Patient has been identified by name and .    Chief Complaint   Patient presents with    Hypertension     Pt reports for BP F/U, no other concerns       Vitals:    24 1323 24 1332   BP: (!) 150/69 122/67   Site: Left Upper Arm Right Upper Arm   Position: Sitting Sitting   Cuff Size: Medium Adult Medium Adult   Pulse: 72 70   Resp: 18    Temp: 97.9 °F (36.6 °C)    TempSrc: Oral    SpO2: 96%    Weight: 55.3 kg (122 lb)    Height: 1.524 m (5')         \"Have you been to the ER, urgent care clinic since your last visit?  Hospitalized since your last visit?\"    NO    “Have you seen or consulted any other health care providers outside of [facility] since your last visit?”    NO

## 2024-06-21 NOTE — PROGRESS NOTES
[facility]   CC: blood pressure follow up   Subjective   Cecilia Weinberg is an 83 y.o. female who presents for hypertension follow up.     Hypertension   Currently on HCTZ 25, losartan 50mg, and amlodipine 5mg   Needs refill of amlodipine   Tolerating medications well, without side effects   SBP ranging 100s-150s / DBP ranging 50-70s    ROS - negative except as noted in HPI    Allergies   Allergies   Allergen Reactions    Amlodipine Other (See Comments)     Caused her teeth to rot    Ampicillin Dermatitis    Lisinopril      Other reaction(s): Unknown (comments)  Mind confusion       Medications  Current Outpatient Medications   Medication Sig    hydroCHLOROthiazide (HYDRODIURIL) 25 MG tablet Take 1 tablet by mouth daily    amLODIPine (NORVASC) 5 MG tablet Take 1 tablet by mouth daily    losartan (COZAAR) 50 MG tablet Take 1 tablet by mouth twice daily    VITAMIN D PO Take by mouth    acetaminophen (TYLENOL) 500 MG tablet Take 1 tablet by mouth every 6 hours as needed for Pain     No current facility-administered medications for this visit.       Medical History  Past Medical History:   Diagnosis Date    High triglycerides     Hypertension        Immunizations   Immunization History   Administered Date(s) Administered    Rabies IM Fibroblast Culture (Rabavert) 02/16/2018, 02/19/2018, 02/23/2018, 03/02/2018    Rabies Immune Globulin 02/16/2018    TDaP, ADACEL (age 10y-64y), BOOSTRIX (age 10y+), IM, 0.5mL 02/15/2018       Objective   Vital Signs  /67 (Site: Right Upper Arm, Position: Sitting, Cuff Size: Medium Adult)   Pulse 70   Temp 97.9 °F (36.6 °C) (Oral)   Resp 18   Ht 1.524 m (5')   Wt 55.3 kg (122 lb)   SpO2 96%   BMI 23.83 kg/m²     Physical Exam  Vitals and nursing note reviewed.   HENT:      Head: Normocephalic and atraumatic.      Mouth/Throat:      Mouth: Mucous membranes are moist.      Pharynx: Oropharynx is clear.   Eyes:

## 2024-06-23 DIAGNOSIS — I10 ESSENTIAL HYPERTENSION: ICD-10-CM

## 2024-06-24 RX ORDER — AMLODIPINE BESYLATE 5 MG/1
5 TABLET ORAL DAILY
Qty: 90 TABLET | Refills: 0 | OUTPATIENT
Start: 2024-06-24

## 2024-06-25 PROBLEM — Z86.73 HISTORY OF STROKE: Status: ACTIVE | Noted: 2024-06-25

## 2024-07-01 RX ORDER — HYDROCHLOROTHIAZIDE 25 MG/1
25 TABLET ORAL DAILY
Qty: 90 TABLET | Refills: 0 | OUTPATIENT
Start: 2024-07-01

## 2024-07-10 RX ORDER — LOSARTAN POTASSIUM 50 MG/1
TABLET ORAL
Qty: 180 TABLET | Refills: 0 | Status: SHIPPED | OUTPATIENT
Start: 2024-07-10

## 2024-08-23 NOTE — TELEPHONE ENCOUNTER
Medication Refill Request    Cecilia Weinberg is requesting a refill of the following medication(s):   Requested Prescriptions     Pending Prescriptions Disp Refills    losartan (COZAAR) 50 MG tablet 180 tablet 0     Sig: Take 1 tablet by mouth 2 times daily        Listed PCP is Pee Frey MD   Last provider to prescribe medication: Dr. Feliz  Last Date of Medication Prescribed: 07/10/24   Date of Last Office Visit at [facility]: 06/21/24   FUTURE APPOINTMENT: No future appointments.    Please send refill to:    [facility] - [place], [place] - [address] [phone] - F [phone]  [address] [place] [address]  Phone: [phone] Fax: [phone]    Please review request and approve or deny with recommendations.

## 2024-08-26 RX ORDER — LOSARTAN POTASSIUM 50 MG/1
50 TABLET ORAL 2 TIMES DAILY
Qty: 180 TABLET | Refills: 0 | Status: SHIPPED | OUTPATIENT
Start: 2024-08-26

## 2024-12-30 RX ORDER — LOSARTAN POTASSIUM 50 MG/1
50 TABLET ORAL 2 TIMES DAILY
Qty: 180 TABLET | Refills: 0 | Status: SHIPPED | OUTPATIENT
Start: 2024-12-30

## 2024-12-30 NOTE — TELEPHONE ENCOUNTER
Medication Refill Request    Cecilia Weinberg is requesting a refill of the following medication(s):   Requested Prescriptions     Pending Prescriptions Disp Refills    losartan (COZAAR) 50 MG tablet 180 tablet 0     Sig: Take 1 tablet by mouth 2 times daily        Listed PCP is Pee Frey MD   Last provider to prescribe medication: Dr. Feliz  Last Date of Medication Prescribed: 08/26/24   Date of Last Office Visit at [facility]: 06/21/24   FUTURE APPOINTMENT:   Future Appointments   Date Time Provider Department Center   1/14/2025 11:00 AM Pee Frey MD [facility] ECC DEP       Please send refill to:    [facility] [address] -  [phone] - F [phone]  [address] [address]  Phone: [phone] Fax: [phone]      Please review request and approve or deny with recommendations.

## 2025-01-13 NOTE — PROGRESS NOTES
[facility]    Subjective   Cecilia Weinberg is a 84 y.o. female who presents for Hypertension    Seen for HTN f/u: Has been taking losartan 50 daily, amlodipine 5 daily. Self decreased losartan 50 bid to daily, she reports it would upset her stomach. Stopped taking HCTZ 25 daily 3 months ago, she states her mail order pharmacy sent her a letter about stopping it. Denies dizziness, lightheadedness, fainting, headaches, vision changes. Has not been taking her BP at home regularly due to extra stress wit her ex- having shingles.    Takes Vitamin D, Vitamin A, Magnesium Chloride + Calcium, Tylenol 650 mg daily    Also HLD. Pt denies history of stroke or TIA, though noted in problem list. Was previously taking statins, was statin intolerant and stopped medication.    Review of Systems   Review of Systems   Review of systems negative except per HPI    Medical History  Past Medical History:   Diagnosis Date    High triglycerides     Hypertension        Medications  Current Outpatient Medications   Medication Sig    losartan (COZAAR) 50 MG tablet Take 1 tablet by mouth 2 times daily (Patient taking differently: Take 1 tablet by mouth daily)    amLODIPine (NORVASC) 5 MG tablet Take 1 tablet by mouth daily    VITAMIN D PO Take by mouth    acetaminophen (TYLENOL) 500 MG tablet Take 1 tablet by mouth every 6 hours as needed for Pain     No current facility-administered medications for this visit.       Immunizations   Immunization History   Administered Date(s) Administered    Rabies IM Fibroblast Culture (Rabavert) 02/16/2018, 02/19/2018, 02/23/2018, 03/02/2018    Rabies Immune Globulin 02/16/2018    TDaP, ADACEL (age 10y-64y), BOOSTRIX (age 10y+), IM, 0.5mL 02/15/2018       Allergies   Allergies   Allergen Reactions    Amlodipine Other (See Comments)     Caused her teeth to rot    Ampicillin Dermatitis    Lisinopril      Other reaction(s): Unknown

## 2025-01-14 ENCOUNTER — OFFICE VISIT (OUTPATIENT)
Age: 85
End: 2025-01-14
Payer: MEDICARE

## 2025-01-14 VITALS
HEIGHT: 60 IN | OXYGEN SATURATION: 97 % | WEIGHT: 122 LBS | DIASTOLIC BLOOD PRESSURE: 61 MMHG | TEMPERATURE: 98 F | SYSTOLIC BLOOD PRESSURE: 135 MMHG | HEART RATE: 74 BPM | BODY MASS INDEX: 23.95 KG/M2 | RESPIRATION RATE: 18 BRPM

## 2025-01-14 DIAGNOSIS — I10 ESSENTIAL HYPERTENSION: Primary | ICD-10-CM

## 2025-01-14 DIAGNOSIS — N18.31 STAGE 3A CHRONIC KIDNEY DISEASE (HCC): ICD-10-CM

## 2025-01-14 DIAGNOSIS — E78.00 PURE HYPERCHOLESTEROLEMIA: ICD-10-CM

## 2025-01-14 PROCEDURE — 99214 OFFICE O/P EST MOD 30 MIN: CPT

## 2025-01-14 SDOH — ECONOMIC STABILITY: FOOD INSECURITY: WITHIN THE PAST 12 MONTHS, YOU WORRIED THAT YOUR FOOD WOULD RUN OUT BEFORE YOU GOT MONEY TO BUY MORE.: PATIENT DECLINED

## 2025-01-14 SDOH — ECONOMIC STABILITY: FOOD INSECURITY: WITHIN THE PAST 12 MONTHS, THE FOOD YOU BOUGHT JUST DIDN'T LAST AND YOU DIDN'T HAVE MONEY TO GET MORE.: PATIENT DECLINED

## 2025-01-14 ASSESSMENT — PATIENT HEALTH QUESTIONNAIRE - PHQ9
SUM OF ALL RESPONSES TO PHQ QUESTIONS 1-9: 0
SUM OF ALL RESPONSES TO PHQ QUESTIONS 1-9: 0
1. LITTLE INTEREST OR PLEASURE IN DOING THINGS: NOT AT ALL
SUM OF ALL RESPONSES TO PHQ QUESTIONS 1-9: 0
2. FEELING DOWN, DEPRESSED OR HOPELESS: NOT AT ALL
SUM OF ALL RESPONSES TO PHQ QUESTIONS 1-9: 0
SUM OF ALL RESPONSES TO PHQ9 QUESTIONS 1 & 2: 0

## 2025-01-14 NOTE — PROGRESS NOTES
Room 19    Patient is accompanied by self. I have received verbal consent from Cecilia Weinberg to discuss any/all medical information while they are present in the room.    Identified pt with two pt identifiers(name and ). Reviewed record in preparation for visit and have obtained necessary documentation.  Chief Complaint   Patient presents with    Hypertension      Stopped HCTZ in 2024 , ( mail order told her she was taking too much medication )      Pt denies any low BP readings      White coat per pt - she checked at home 167/77 P 64 at 745am      Refuses flu vaccine    Health Maintenance Due   Topic    Pneumococcal 65+ years Vaccine (1 of 1 - PCV)    Respiratory Syncytial Virus (RSV) Pregnant or age 60 yrs+ (1 - 1-dose 75+ series)    Shingles vaccine (2 of 3)    Flu vaccine (1)    COVID-19 Vaccine ( -  season)    Annual Wellness Visit (Medicare Advantage)        There were no vitals filed for this visit.    Social Determinants Of Health:       SDOH screening not required at visit.  Resources Declined.   See AVS for attached resources, if requested.    Coordination of Care Questionnaire:       \"Have you been to the ER, urgent care clinic since your last visit?  Hospitalized since your last visit?\"    NO    “Have you seen or consulted any other health care providers outside of [facility] since your last visit?”    NO            Click Here for Release of Records Request

## 2025-01-17 ENCOUNTER — LAB (OUTPATIENT)
Age: 85
End: 2025-01-17

## 2025-01-17 DIAGNOSIS — E78.00 PURE HYPERCHOLESTEROLEMIA: ICD-10-CM

## 2025-01-17 DIAGNOSIS — I10 ESSENTIAL HYPERTENSION: ICD-10-CM

## 2025-01-18 LAB
ALBUMIN SERPL-MCNC: 3.6 G/DL (ref 3.5–5)
ALBUMIN/GLOB SERPL: 1.1 (ref 1.1–2.2)
ALP SERPL-CCNC: 94 U/L (ref 45–117)
ALT SERPL-CCNC: 17 U/L (ref 12–78)
ANION GAP SERPL CALC-SCNC: 5 MMOL/L (ref 2–12)
AST SERPL-CCNC: 13 U/L (ref 15–37)
BILIRUB SERPL-MCNC: 0.5 MG/DL (ref 0.2–1)
BUN SERPL-MCNC: 25 MG/DL (ref 6–20)
BUN/CREAT SERPL: 23 (ref 12–20)
CALCIUM SERPL-MCNC: 10.1 MG/DL (ref 8.5–10.1)
CHLORIDE SERPL-SCNC: 106 MMOL/L (ref 97–108)
CHOLEST SERPL-MCNC: 249 MG/DL
CO2 SERPL-SCNC: 28 MMOL/L (ref 21–32)
CREAT SERPL-MCNC: 1.1 MG/DL (ref 0.55–1.02)
GLOBULIN SER CALC-MCNC: 3.3 G/DL (ref 2–4)
GLUCOSE SERPL-MCNC: 103 MG/DL (ref 65–100)
HDLC SERPL-MCNC: 67 MG/DL
HDLC SERPL: 3.7 (ref 0–5)
LDLC SERPL CALC-MCNC: 153.6 MG/DL (ref 0–100)
POTASSIUM SERPL-SCNC: 5 MMOL/L (ref 3.5–5.1)
PROT SERPL-MCNC: 6.9 G/DL (ref 6.4–8.2)
SODIUM SERPL-SCNC: 139 MMOL/L (ref 136–145)
TRIGL SERPL-MCNC: 142 MG/DL
VLDLC SERPL CALC-MCNC: 28.4 MG/DL

## 2025-03-07 DIAGNOSIS — I10 HYPERTENSION, ISOLATED SYSTOLIC: ICD-10-CM

## 2025-03-09 RX ORDER — AMLODIPINE BESYLATE 5 MG/1
5 TABLET ORAL DAILY
Qty: 90 TABLET | Refills: 3 | Status: SHIPPED | OUTPATIENT
Start: 2025-03-09

## 2025-04-17 ENCOUNTER — TELEPHONE (OUTPATIENT)
Age: 85
End: 2025-04-17

## 2025-04-17 NOTE — TELEPHONE ENCOUNTER
Hi. Dr. Frey,       Received a faxed refill request for medication(s):      hydroCHLOROthiazide (HYDRODIURIL) 25 MG tablet     Please send to:     Pharmacy    [facility] - [place], [place] - [address] -  [phone] - F [phone]  [address] [place] [address] [facility] [address]  Phone: [phone]  Fax: [phone]          Fax copy attached to this message. If an appointment is needed please send to YOUR PSR.     Thank you.

## 2025-08-04 RX ORDER — LOSARTAN POTASSIUM 50 MG/1
50 TABLET ORAL 2 TIMES DAILY
Qty: 180 TABLET | Refills: 0 | Status: SHIPPED | OUTPATIENT
Start: 2025-08-04